# Patient Record
Sex: FEMALE | Race: WHITE | NOT HISPANIC OR LATINO | Employment: UNEMPLOYED | URBAN - METROPOLITAN AREA
[De-identification: names, ages, dates, MRNs, and addresses within clinical notes are randomized per-mention and may not be internally consistent; named-entity substitution may affect disease eponyms.]

---

## 2018-09-21 ENCOUNTER — OFFICE VISIT CONVERTED (OUTPATIENT)
Dept: FAMILY MEDICINE CLINIC | Facility: CLINIC | Age: 53
End: 2018-09-21
Attending: NURSE PRACTITIONER

## 2019-11-25 ENCOUNTER — OFFICE VISIT CONVERTED (OUTPATIENT)
Dept: FAMILY MEDICINE CLINIC | Facility: CLINIC | Age: 54
End: 2019-11-25
Attending: FAMILY MEDICINE

## 2019-11-25 ENCOUNTER — CONVERSION ENCOUNTER (OUTPATIENT)
Dept: FAMILY MEDICINE CLINIC | Facility: CLINIC | Age: 54
End: 2019-11-25

## 2019-11-25 ENCOUNTER — HOSPITAL ENCOUNTER (OUTPATIENT)
Dept: FAMILY MEDICINE CLINIC | Facility: CLINIC | Age: 54
Discharge: HOME | End: 2019-11-25
Attending: FAMILY MEDICINE

## 2019-11-25 LAB
ALBUMIN SERPL-MCNC: 4.4 G/DL (ref 3.5–5)
ALBUMIN/GLOB SERPL: 1.6 {RATIO} (ref 1.4–2.6)
ALP SERPL-CCNC: 149 U/L (ref 53–141)
ALT SERPL-CCNC: 13 U/L (ref 10–40)
ANION GAP SERPL CALC-SCNC: 21 MMOL/L (ref 8–19)
AST SERPL-CCNC: 18 U/L (ref 15–50)
BASOPHILS # BLD AUTO: 0.08 10*3/UL (ref 0–0.2)
BASOPHILS NFR BLD AUTO: 0.9 % (ref 0–3)
BILIRUB SERPL-MCNC: 0.26 MG/DL (ref 0.2–1.3)
BUN SERPL-MCNC: 10 MG/DL (ref 5–25)
BUN/CREAT SERPL: 11 {RATIO} (ref 6–20)
CALCIUM SERPL-MCNC: 9.9 MG/DL (ref 8.7–10.4)
CHLORIDE SERPL-SCNC: 101 MMOL/L (ref 99–111)
CHOLEST SERPL-MCNC: 195 MG/DL (ref 107–200)
CHOLEST/HDLC SERPL: 3.6 {RATIO} (ref 3–6)
CONV ABS IMM GRAN: 0.04 10*3/UL (ref 0–0.2)
CONV CO2: 23 MMOL/L (ref 22–32)
CONV IMMATURE GRAN: 0.5 % (ref 0–1.8)
CONV TOTAL PROTEIN: 7.2 G/DL (ref 6.3–8.2)
CREAT UR-MCNC: 0.95 MG/DL (ref 0.5–0.9)
DEPRECATED RDW RBC AUTO: 40 FL (ref 36.4–46.3)
EOSINOPHIL # BLD AUTO: 0.11 10*3/UL (ref 0–0.7)
EOSINOPHIL # BLD AUTO: 1.3 % (ref 0–7)
ERYTHROCYTE [DISTWIDTH] IN BLOOD BY AUTOMATED COUNT: 12.4 % (ref 11.7–14.4)
GFR SERPLBLD BASED ON 1.73 SQ M-ARVRAT: >60 ML/MIN/{1.73_M2}
GLOBULIN UR ELPH-MCNC: 2.8 G/DL (ref 2–3.5)
GLUCOSE SERPL-MCNC: 187 MG/DL (ref 65–99)
HCT VFR BLD AUTO: 39.5 % (ref 37–47)
HDLC SERPL-MCNC: 54 MG/DL (ref 40–60)
HGB BLD-MCNC: 13.2 G/DL (ref 12–16)
LDLC SERPL CALC-MCNC: 126 MG/DL (ref 70–100)
LYMPHOCYTES # BLD AUTO: 2.22 10*3/UL (ref 1–5)
LYMPHOCYTES NFR BLD AUTO: 25.9 % (ref 20–45)
MCH RBC QN AUTO: 29.9 PG (ref 27–31)
MCHC RBC AUTO-ENTMCNC: 33.4 G/DL (ref 33–37)
MCV RBC AUTO: 89.6 FL (ref 81–99)
MONOCYTES # BLD AUTO: 0.52 10*3/UL (ref 0.2–1.2)
MONOCYTES NFR BLD AUTO: 6.1 % (ref 3–10)
NEUTROPHILS # BLD AUTO: 5.61 10*3/UL (ref 2–8)
NEUTROPHILS NFR BLD AUTO: 65.3 % (ref 30–85)
NRBC CBCN: 0 % (ref 0–0.7)
OSMOLALITY SERPL CALC.SUM OF ELEC: 294 MOSM/KG (ref 273–304)
PLATELET # BLD AUTO: 276 10*3/UL (ref 130–400)
PMV BLD AUTO: 9.9 FL (ref 9.4–12.3)
POTASSIUM SERPL-SCNC: 4.6 MMOL/L (ref 3.5–5.3)
RBC # BLD AUTO: 4.41 10*6/UL (ref 4.2–5.4)
SODIUM SERPL-SCNC: 140 MMOL/L (ref 135–147)
T4 FREE SERPL-MCNC: 1.4 NG/DL (ref 0.9–1.8)
TRIGL SERPL-MCNC: 77 MG/DL (ref 40–150)
TSH SERPL-ACNC: 3.14 M[IU]/L (ref 0.27–4.2)
VLDLC SERPL-MCNC: 15 MG/DL (ref 5–37)
WBC # BLD AUTO: 8.58 10*3/UL (ref 4.8–10.8)

## 2019-12-16 ENCOUNTER — CONVERSION ENCOUNTER (OUTPATIENT)
Dept: FAMILY MEDICINE CLINIC | Facility: CLINIC | Age: 54
End: 2019-12-16

## 2019-12-16 ENCOUNTER — OFFICE VISIT CONVERTED (OUTPATIENT)
Dept: FAMILY MEDICINE CLINIC | Facility: CLINIC | Age: 54
End: 2019-12-16
Attending: FAMILY MEDICINE

## 2019-12-16 ENCOUNTER — HOSPITAL ENCOUNTER (OUTPATIENT)
Dept: FAMILY MEDICINE CLINIC | Facility: CLINIC | Age: 54
Discharge: HOME | End: 2019-12-16
Attending: FAMILY MEDICINE

## 2019-12-16 LAB
EST. AVERAGE GLUCOSE BLD GHB EST-MCNC: 197 MG/DL
HBA1C MFR BLD: 8.5 % (ref 3.5–5.7)

## 2019-12-18 LAB
C DIFF TOX B STL QL CT TISS CULT: NEGATIVE
CONV 027 TOXIN: NEGATIVE

## 2019-12-20 ENCOUNTER — CONVERSION ENCOUNTER (OUTPATIENT)
Dept: FAMILY MEDICINE CLINIC | Facility: CLINIC | Age: 54
End: 2019-12-20

## 2019-12-20 LAB — BACTERIA SPEC AEROBE CULT: NORMAL

## 2019-12-30 ENCOUNTER — OFFICE VISIT CONVERTED (OUTPATIENT)
Dept: FAMILY MEDICINE CLINIC | Facility: CLINIC | Age: 54
End: 2019-12-30
Attending: FAMILY MEDICINE

## 2019-12-30 ENCOUNTER — HOSPITAL ENCOUNTER (OUTPATIENT)
Dept: FAMILY MEDICINE CLINIC | Facility: CLINIC | Age: 54
Discharge: HOME | End: 2019-12-30
Attending: FAMILY MEDICINE

## 2019-12-30 ENCOUNTER — CONVERSION ENCOUNTER (OUTPATIENT)
Dept: FAMILY MEDICINE CLINIC | Facility: CLINIC | Age: 54
End: 2019-12-30

## 2020-01-02 LAB
AMPICILLIN SUSC ISLT: <=2
BACTERIA UR CULT: ABNORMAL
CIPROFLOXACIN SUSC ISLT: 1
CONV GENTAMICIN HIGH LEVEL SYNERGY: ABNORMAL
CONV STREPTOMYCIN HIGH LEVEL SYNERGY: ABNORMAL
DAPTOMYCIN SUSC ISLT: 4
DOXYCYCLINE SUSC ISLT: <=0.5
ERYTHROMYCIN SUSC ISLT: <=0.25
LEVOFLOXACIN SUSC ISLT: 2
LINEZOLID SUSC ISLT: 2
NITROFURANTOIN SUSC ISLT: <=16
TETRACYCLINE SUSC ISLT: <=1
VANCOMYCIN SUSC ISLT: 2

## 2020-01-08 ENCOUNTER — CONVERSION ENCOUNTER (OUTPATIENT)
Dept: FAMILY MEDICINE CLINIC | Facility: CLINIC | Age: 55
End: 2020-01-08

## 2020-01-14 ENCOUNTER — HOSPITAL ENCOUNTER (OUTPATIENT)
Dept: FAMILY MEDICINE CLINIC | Facility: CLINIC | Age: 55
Discharge: HOME | End: 2020-01-14
Attending: FAMILY MEDICINE

## 2020-01-14 LAB
APPEARANCE UR: CLEAR
BILIRUB UR QL: NEGATIVE
COLOR UR: YELLOW
CONV BACTERIA: NEGATIVE
CONV COLLECTION SOURCE (UA): ABNORMAL
CONV UROBILINOGEN IN URINE BY AUTOMATED TEST STRIP: 0.2 {EHRLICHU}/DL (ref 0.1–1)
GLUCOSE UR QL: NEGATIVE MG/DL
HGB UR QL STRIP: NEGATIVE
KETONES UR QL STRIP: NEGATIVE MG/DL
LEUKOCYTE ESTERASE UR QL STRIP: ABNORMAL
NITRITE UR QL STRIP: NEGATIVE
PH UR STRIP.AUTO: 5.5 [PH] (ref 5–8)
PROT UR QL: NEGATIVE MG/DL
RBC #/AREA URNS HPF: ABNORMAL /[HPF]
SP GR UR: 1.02 (ref 1–1.03)
WBC #/AREA URNS HPF: ABNORMAL /[HPF]

## 2020-01-16 LAB — BACTERIA UR CULT: NORMAL

## 2020-01-20 ENCOUNTER — OFFICE VISIT CONVERTED (OUTPATIENT)
Dept: FAMILY MEDICINE CLINIC | Facility: CLINIC | Age: 55
End: 2020-01-20
Attending: FAMILY MEDICINE

## 2020-01-20 ENCOUNTER — HOSPITAL ENCOUNTER (OUTPATIENT)
Dept: FAMILY MEDICINE CLINIC | Facility: CLINIC | Age: 55
Discharge: HOME | End: 2020-01-20
Attending: FAMILY MEDICINE

## 2020-01-20 ENCOUNTER — CONVERSION ENCOUNTER (OUTPATIENT)
Dept: FAMILY MEDICINE CLINIC | Facility: CLINIC | Age: 55
End: 2020-01-20

## 2020-01-28 ENCOUNTER — OFFICE VISIT CONVERTED (OUTPATIENT)
Dept: FAMILY MEDICINE CLINIC | Facility: CLINIC | Age: 55
End: 2020-01-28
Attending: NURSE PRACTITIONER

## 2020-02-03 ENCOUNTER — OFFICE VISIT CONVERTED (OUTPATIENT)
Dept: FAMILY MEDICINE CLINIC | Facility: CLINIC | Age: 55
End: 2020-02-03
Attending: FAMILY MEDICINE

## 2020-02-20 ENCOUNTER — OFFICE VISIT CONVERTED (OUTPATIENT)
Dept: FAMILY MEDICINE CLINIC | Facility: CLINIC | Age: 55
End: 2020-02-20
Attending: FAMILY MEDICINE

## 2020-04-13 ENCOUNTER — OFFICE VISIT CONVERTED (OUTPATIENT)
Dept: OTHER | Facility: HOSPITAL | Age: 55
End: 2020-04-13
Attending: PHYSICIAN ASSISTANT

## 2020-04-13 ENCOUNTER — HOSPITAL ENCOUNTER (OUTPATIENT)
Dept: OTHER | Facility: HOSPITAL | Age: 55
Discharge: HOME | End: 2020-04-13
Attending: PHYSICIAN ASSISTANT

## 2020-04-15 LAB — SARS-COV-2 RNA SPEC QL NAA+PROBE: NOT DETECTED

## 2020-04-17 ENCOUNTER — OFFICE VISIT CONVERTED (OUTPATIENT)
Dept: FAMILY MEDICINE CLINIC | Facility: CLINIC | Age: 55
End: 2020-04-17
Attending: FAMILY MEDICINE

## 2020-04-17 ENCOUNTER — HOSPITAL ENCOUNTER (OUTPATIENT)
Dept: FAMILY MEDICINE CLINIC | Facility: CLINIC | Age: 55
Discharge: HOME | End: 2020-04-17
Attending: FAMILY MEDICINE

## 2020-04-17 LAB
ALBUMIN SERPL-MCNC: 4.5 G/DL (ref 3.5–5)
ALBUMIN/GLOB SERPL: 1.6 {RATIO} (ref 1.4–2.6)
ALP SERPL-CCNC: 129 U/L (ref 53–141)
ALT SERPL-CCNC: 15 U/L (ref 10–40)
ANION GAP SERPL CALC-SCNC: 20 MMOL/L (ref 8–19)
AST SERPL-CCNC: 14 U/L (ref 15–50)
BASOPHILS # BLD AUTO: 0.06 10*3/UL (ref 0–0.2)
BASOPHILS NFR BLD AUTO: 0.8 % (ref 0–3)
BILIRUB SERPL-MCNC: 0.32 MG/DL (ref 0.2–1.3)
BUN SERPL-MCNC: 14 MG/DL (ref 5–25)
BUN/CREAT SERPL: 15 {RATIO} (ref 6–20)
CALCIUM SERPL-MCNC: 10 MG/DL (ref 8.7–10.4)
CHLORIDE SERPL-SCNC: 103 MMOL/L (ref 99–111)
CONV ABS IMM GRAN: 0.01 10*3/UL (ref 0–0.2)
CONV CO2: 23 MMOL/L (ref 22–32)
CONV IMMATURE GRAN: 0.1 % (ref 0–1.8)
CONV TOTAL PROTEIN: 7.4 G/DL (ref 6.3–8.2)
CREAT UR-MCNC: 0.93 MG/DL (ref 0.5–0.9)
DEPRECATED RDW RBC AUTO: 41.4 FL (ref 36.4–46.3)
EOSINOPHIL # BLD AUTO: 0.07 10*3/UL (ref 0–0.7)
EOSINOPHIL # BLD AUTO: 0.9 % (ref 0–7)
ERYTHROCYTE [DISTWIDTH] IN BLOOD BY AUTOMATED COUNT: 12.6 % (ref 11.7–14.4)
GFR SERPLBLD BASED ON 1.73 SQ M-ARVRAT: >60 ML/MIN/{1.73_M2}
GLOBULIN UR ELPH-MCNC: 2.9 G/DL (ref 2–3.5)
GLUCOSE SERPL-MCNC: 150 MG/DL (ref 65–99)
HCT VFR BLD AUTO: 41.3 % (ref 37–47)
HGB BLD-MCNC: 13.5 G/DL (ref 12–16)
LYMPHOCYTES # BLD AUTO: 1.86 10*3/UL (ref 1–5)
LYMPHOCYTES NFR BLD AUTO: 24.2 % (ref 20–45)
MCH RBC QN AUTO: 29.5 PG (ref 27–31)
MCHC RBC AUTO-ENTMCNC: 32.7 G/DL (ref 33–37)
MCV RBC AUTO: 90.4 FL (ref 81–99)
MONOCYTES # BLD AUTO: 0.46 10*3/UL (ref 0.2–1.2)
MONOCYTES NFR BLD AUTO: 6 % (ref 3–10)
NEUTROPHILS # BLD AUTO: 5.23 10*3/UL (ref 2–8)
NEUTROPHILS NFR BLD AUTO: 68 % (ref 30–85)
NRBC CBCN: 0 % (ref 0–0.7)
OSMOLALITY SERPL CALC.SUM OF ELEC: 297 MOSM/KG (ref 273–304)
PLATELET # BLD AUTO: 265 10*3/UL (ref 130–400)
PMV BLD AUTO: 10 FL (ref 9.4–12.3)
POTASSIUM SERPL-SCNC: 4.1 MMOL/L (ref 3.5–5.3)
RBC # BLD AUTO: 4.57 10*6/UL (ref 4.2–5.4)
SODIUM SERPL-SCNC: 142 MMOL/L (ref 135–147)
TROPONIN T SERPL-MCNC: <0.01 NG/ML (ref 0–0.1)
WBC # BLD AUTO: 7.69 10*3/UL (ref 4.8–10.8)

## 2020-04-19 LAB — BACTERIA SPEC AEROBE CULT: NORMAL

## 2020-04-29 ENCOUNTER — OFFICE VISIT CONVERTED (OUTPATIENT)
Dept: CARDIOLOGY | Facility: CLINIC | Age: 55
End: 2020-04-29
Attending: INTERNAL MEDICINE

## 2020-05-12 ENCOUNTER — HOSPITAL ENCOUNTER (OUTPATIENT)
Dept: NUCLEAR MEDICINE | Facility: HOSPITAL | Age: 55
Discharge: HOME | End: 2020-05-12
Attending: INTERNAL MEDICINE

## 2020-05-18 ENCOUNTER — OFFICE VISIT CONVERTED (OUTPATIENT)
Dept: FAMILY MEDICINE CLINIC | Facility: CLINIC | Age: 55
End: 2020-05-18
Attending: FAMILY MEDICINE

## 2020-07-02 ENCOUNTER — OFFICE VISIT CONVERTED (OUTPATIENT)
Dept: FAMILY MEDICINE CLINIC | Facility: CLINIC | Age: 55
End: 2020-07-02
Attending: FAMILY MEDICINE

## 2020-10-29 ENCOUNTER — HOSPITAL ENCOUNTER (OUTPATIENT)
Dept: FAMILY MEDICINE CLINIC | Facility: CLINIC | Age: 55
Discharge: HOME | End: 2020-10-29
Attending: FAMILY MEDICINE

## 2020-10-29 ENCOUNTER — CONVERSION ENCOUNTER (OUTPATIENT)
Dept: FAMILY MEDICINE CLINIC | Facility: CLINIC | Age: 55
End: 2020-10-29

## 2020-10-31 LAB — BACTERIA SPEC AEROBE CULT: NORMAL

## 2020-11-01 LAB — SARS-COV-2 RNA SPEC QL NAA+PROBE: NOT DETECTED

## 2020-11-30 ENCOUNTER — OFFICE VISIT CONVERTED (OUTPATIENT)
Dept: FAMILY MEDICINE CLINIC | Facility: CLINIC | Age: 55
End: 2020-11-30
Attending: FAMILY MEDICINE

## 2020-11-30 ENCOUNTER — HOSPITAL ENCOUNTER (OUTPATIENT)
Dept: FAMILY MEDICINE CLINIC | Facility: CLINIC | Age: 55
Discharge: HOME | End: 2020-11-30
Attending: FAMILY MEDICINE

## 2020-12-02 LAB — SARS-COV-2 RNA SPEC QL NAA+PROBE: NOT DETECTED
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
## 2021-05-07 NOTE — PROGRESS NOTES
Progress Note      Patient Name: Yamilex David   Patient ID: [id number]   Sex: Female   YOB: 1965        Visit Date: November 25, 2019    Provider: Emory Mendez MD   Location: [facility]   Location Address: [address] [place] [address] [place]  [address]   Location Phone: [phone]          Chief Complaint     asking for refills on her buspirone and her lithium due to she can not afford to keep going to the Phys office and do all their classes to keep on the medicines.   she is also asking for her Pantoprazole filled.       History Of Present Illness  Yamilex Dvaid is a 54 year old /White female who presents for evaluation and treatment of:      pt was on lithium for depression- pt told to see psyche if she needs to continue lithium due to side effect profile- pt not on same meds so is willing to try Prozac in place of lithium- pt no longer on levodopa- pt has been off all her meds including lithium and buspar for 1 yr    GERD- uncontrolled- need to restart protonix       Past Medical History  Disease Name Date Onset Notes   Anxiety --  --    Anxiety and depression 11/25/2019 --    Depression --  --    Essential hypertension 11/25/2019 --    GERD (gastroesophageal reflux disease) --  --    Hypertension, Benign Essential --  --    Seizures --  --          Past Surgical History  Procedure Name Date Notes   Hysterectomy --  --    Hysterectomy (due to cancer) --  --    Kidney removal --  --    Thyroid Removal --  --    Tonsillectomy --  --          Medication List  Name Date Started Instructions   Benadryl 25 mg oral capsule  --    Parlodel 2.5 mg oral tablet  take 1 tablet (2.5 mg) by oral route 3 times per day with food         Allergy List  Allergen Name Date Reaction Notes   NO KNOWN DRUG ALLERGIES --  --  --        Allergies Reconciled  Family Medical History  Disease Name Relative/Age Notes   DM Type I Father/  Mother/   Father; Mother   Hypertension Mother/    "Mother   Arthrtis Mother/   Mother   Mother, Grandmother, or Sister developed Heart Disease before the age of 65  --          Social History  Finding Status Start/Stop Quantity Notes   Recreational Drug Use Never --/-- --  never used   Second hand smoke exposure Unknown --/-- --  no   Tobacco Never --/-- --  never smoker   Uses seatbelts --  --/-- --  yes         Review of Systems  · Constitutional  o Denies  o : fatigue, fever  · Cardiovascular  o Denies  o : chest pain, palpitations  · Respiratory  o Denies  o : shortness of breath, cough  · Gastrointestinal  o Admits  o : heartburn  o Denies  o : nausea, vomiting, diarrhea, abdominal pain  · Psychiatric  o Admits  o : anxiety, depression, difficulty sleeping  o Denies  o : suicidal ideation, homicidal ideation      Vitals  Date Time BP Position Site L\R Cuff Size HR RR TEMP (F) WT  HT  BMI kg/m2 BSA m2 O2 Sat HC       11/25/2019 09:38 /104 Sitting    98 - R  96.8 179lbs 2oz 5'  3\" 31.73 1.9 97 %    11/25/2019 10:13 /102 Sitting                     Physical Examination  · Constitutional  o Appearance  o : well developed, well-nourished, in no acute distress  · Head and Face  o HEENT  o : Unremarkable  · Respiratory  o Respiratory Effort  o : breathing unlabored  o Auscultation of Lungs  o : clear to ascultation  · Cardiovascular  o Heart  o :   § Auscultation of Heart  § : regular rate and rhythm  o Peripheral Vascular System  o :   § Extremities  § : no edema  · Gastrointestinal  o Abdomen  o : soft, non-tender, non-distended, + bowel sounds, no hepatosplenomegaly, no masses palpated  · Musculoskeletal  o General  o :   § General Musculoskeletal  § : No joint swelling or deformity., Muscle tone, strength, and development grossly normal.  · Neurologic  o Gait and Station  o :   § Gait Screening  § : normal gait  · Psychiatric  o Mood and Affect  o : mood normal, affect appropriate          Assessment  · Essential hypertension     401.9/I10  · GERD " (gastroesophageal reflux disease)     530.81/K21.9  · Anxiety and depression       Anxiety disorder, unspecified     300.00/F41.9  Major depressive disorder, single episode, unspecified     300.00/F32.9  · Screening cholesterol level     V77.91/Z13.220      Plan  · Orders  o CBC with Auto Diff [facility] ([id number]) - 401.9/I10, 300.00/F41.9, 300.00/F32.9, 530.81/K21.9 - 11/25/2019  o CMP [facility] ([id number]) - 401.9/I10 - 11/25/2019  o Lipid Panel [facility] ([id number]) - 401.9/I10 - 11/25/2019  o Thyroid Profile (THYII) - 401.9/I10, 300.00/F41.9, 300.00/F32.9 - 11/25/2019  o ACO-39: Current medications updated and reviewed () - - 11/25/2019  · Medications  o Prozac 20 mg oral capsule   SIG: take 1 capsule (20 mg) by oral route once daily in the evening for 30 days   DISP: (30) capsules with 1 refills  Prescribed on 11/25/2019     o buspirone 7.5 mg oral tablet   SIG: take 1 tablet (7.5 mg) by oral route 2 times per day for 30 days   DISP: (60) tablets with 1 refills  Prescribed on 11/25/2019     o Protonix 40 mg oral tablet,delayed release (DR/EC)   SIG: take 1 tablet (40 mg) by oral route once daily for 30 days   DISP: (30) tablets with 1 refills  Prescribed on 11/25/2019     o lisinopril 10 mg oral tablet   SIG: take 1 tablet (10 mg) by oral route once daily for 30 days   DISP: (30) tablets with 1 refills  Prescribed on 11/25/2019     o Tegretol oral   SIG: take 1 by oral route 3 times a day   DISP: # 0 with 0 refills  Discontinued on 11/25/2019     o Medications have been Reconciled  o Transition of Care or Provider Policy  · Instructions  o Patient was educated/instructed on their diagnosis, treatment and medications prior to discharge from the clinic today.  · Disposition  o Return Visit Request in/on 1 week +/- 2 days ([id number]).            Electronically Signed by: Emory Mendez MD -Author on November 25, 2019 10:15:10 AM
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
## 2021-05-07 NOTE — PROGRESS NOTES
Progress Note      Patient Name: Yamilex David   Patient ID: [id number]   Sex: Female   YOB: 1965    Primary Care Provider: Emory Mendez MD   Referring Provider: Emory Mendez MD    Visit Date: December 30, 2019    Provider: Emory Mendez MD   Location: [facility]   Location Address: [address] [place] [address]   [place]  [address]   Location Phone: [phone]          Chief Complaint     Follow up on blood pressure. Dysuria x one week. Check urine       History Of Present Illness  Yamilex David is a 54 year old /White female who presents for evaluation and treatment of:      urinary symptoms x 1-1.5 week- sudden onset- worsening symptoms  HTN- uncontrolled but getting better- pt on lisinopril 10mg BID  Carafate is helping stomach a lot  discussed labs  newly diagnosed diabetes       Past Medical History  Disease Name Date Onset Notes   Anxiety --  --    Anxiety and depression 11/25/2019 --    Depression --  --    Essential hypertension 11/25/2019 --    GERD (gastroesophageal reflux disease) --  --    Hypertension, Benign Essential --  --    Seizures --  --          Past Surgical History  Procedure Name Date Notes   Hysterectomy --  --    Hysterectomy (due to cancer) --  --    Kidney removal --  --    Thyroid Removal --  --    Tonsillectomy --  --          Medication List  Name Date Started Instructions   Benadryl 25 mg oral capsule  --    buspirone 7.5 mg oral tablet 11/25/2019 take 1 tablet (7.5 mg) by oral route 2 times per day for 30 days   Carafate 1 gram oral tablet 12/16/2019 take 1 tablet (1 gram) by oral route 4 times per day on an empty stomach 1 hour before meals and at bedtime for 30 days   lisinopril 30 mg oral tablet 12/30/2019 take 1 tablet (30 mg) by oral route once daily for 30 days   Parlodel 2.5 mg oral tablet  take 1 tablet (2.5 mg) by oral route 3 times per day with food   Protonix 40 mg oral tablet,delayed release (/EC) 11/25/2019 take 1  tablet (40 mg) by oral route once daily for 30 days   Prozac 20 mg oral capsule 11/25/2019 take 1 capsule (20 mg) by oral route once daily in the evening for 30 days   Tegretol 100 mg/5 mL oral suspension  2 PILLS TID         Allergy List  Allergen Name Date Reaction Notes   NO KNOWN DRUG ALLERGIES --  --  --          Family Medical History  Disease Name Relative/Age Notes   DM Type I Father/  Mother/   Father; Mother   Hypertension Mother/   Mother   Arthrtis Mother/   Mother   Mother, Grandmother, or Sister developed Heart Disease before the age of 65  --          Social History  Finding Status Start/Stop Quantity Notes   Recreational Drug Use Never --/-- --  never used   Second hand smoke exposure Unknown --/-- --  no   Tobacco Never --/-- --  never smoker   Uses seatbelts --  --/-- --  yes         Review of Systems  · Constitutional  o Denies  o : fatigue, fever  · Cardiovascular  o Denies  o : chest pain, palpitations  · Respiratory  o Denies  o : shortness of breath, cough  · Gastrointestinal  o Denies  o : nausea, vomiting, diarrhea  · Genitourinary  o Admits  o : dysuria  o Denies  o : urinary retention      Vitals  Date Time BP Position Site L\R Cuff Size HR RR TEMP (F) WT  HT  BMI kg/m2 BSA m2 O2 Sat        12/30/2019 05:02 /84 Sitting    94 - R  97.7 174lbs 16oz    96 %          Physical Examination  · Constitutional  o Appearance  o : well developed, well-nourished, in no acute distress  · Respiratory  o Respiratory Effort  o : breathing unlabored  o Auscultation of Lungs  o : clear to ascultation  · Cardiovascular  o Heart  o :   § Auscultation of Heart  § : regular rate and rhythm  o Peripheral Vascular System  o :   § Extremities  § : no edema  · Gastrointestinal  o Abdomen  o : soft, non-tender, non-distended, + bowel sounds, no hepatosplenomegaly, no masses palpated, no CVA tenderness  · Musculoskeletal  o General  o :   § General Musculoskeletal  § : No joint swelling or deformity., Muscle  tone, strength, and development grossly normal.  · Neurologic  o Gait and Station  o :   § Gait Screening  § : normal gait  · Psychiatric  o Mood and Affect  o : mood normal, affect appropriate  · Left DM Foot Exam  o Sensation  o : normal sensory exam perceptible to 10-gram nylon monofilament exam (5/5), vibration and light touch.  o Visual Inspection  o : visual inspection is normal with no signs of breakdown, ulcerations or deformities unless otherwise noted.   o Vascular  o : palpable dorsalis pedis and posterir tibialis pulses present, normal capillary refill  · Right DM Foot Exam  o Sensation  o : normal sensory exam perceptible to 10-gram nylon monofilament exam (5/5), vibration and light touch.  o Visual Inspection  o : visual inspection is normal with no signs of breakdown, ulcerations or deformities unless otherwise noted.   o Vascular  o : palpable dorsalis pedis and posterir tibialis pulses present, normal capillary refill          Assessment  · Diabetes mellitus, type 2     250.00/E11.9  · Essential hypertension     401.9/I10  · Dysuria     788.1/R30.0  · UTI (urinary tract infection)     599.0/N39.0  · Newly diagnosed diabetes     250.00/E11.9      Plan  · Orders  o OPHTHALMOLOGY CONSULTATION (OPHTH) - 250.00/E11.9 - 12/30/2019  o Diabetic Foot (Motor and Sensory) Exam Completed [facility] (, , 2028F) - 250.00/E11.9 - 12/30/2019  o IOP - Urinalysis without Microscopy (Clinitek) [facility] ([id number]) - - 12/30/2019   GLU NEG CATERINA NEG KET TRACE SG 1.025 BLO TRACE PH 5.0 PRO NEG URO 0.2 NIT NEG MICHEL MOD  o Urine culture (23512, 78509) - 788.1/R30.0, 401.9/I10, 599.0/N39.0 - 12/30/2019  o ACO-39: Current medications updated and reviewed () - - 12/30/2019  · Medications  o Macrobid 100 mg oral capsule   SIG: take 1 capsule (100 mg) by oral route every 12 hours with food for 7 days   DISP: (14) capsules with 0 refills  Prescribed on 12/30/2019     o Pyridium 100 mg oral tablet   SIG: take 1 tablet (100 mg) by  oral route 3 times per day after meals for 4 days   DISP: (12) tablets with 0 refills  Prescribed on 12/30/2019     o metformin 500 mg oral tablet extended release 24 hr   SIG: take 2 tablets (1,000 mg) by oral route 2 times per day for 30 days   DISP: (120) tablets with 5 refills  Prescribed on 12/30/2019     o lisinopril 30 mg oral tablet   SIG: take 1 tablet (30 mg) by oral route once daily for 30 days   DISP: (30) tablets with 1 refills  Adjusted on 12/30/2019     · Instructions  o Handouts were given to patient: diet.  o Patient instructed/educated on their diet and exercise program.  o Patient was educated/instructed on their diagnosis, treatment and medications prior to discharge from the clinic today.  o Electronically Identified Patient Education Materials Provided Electronically            Electronically Signed by: Emory Mendez MD -Author on December 30, 2019 05:34:28 PM

## 2021-05-07 NOTE — PROGRESS NOTES
Progress Note      Patient Name: Yamilex David   Patient ID: [id number]   Sex: Female   YOB: 1965        Visit Date: December 16, 2019    Provider: Emory Mendez MD   Location: [facility]   Location Address: [address] [place] [address] [place]  [address]   Location Phone: [phone]          Chief Complaint     follow up on new medicine. biggest concern is diarrhea for 1.5 weeks and 3 days of blood in stool. calmer and more stable.   Lisinopril and buspar started the monday after thanksgiving. Started Prozac 2 weeks ago.  still has a lot of stress on her. he father past since being here.       History Of Present Illness  Yamilex David is a 54 year old /White female who presents for evaluation and treatment of:      pt says new meds helping  HTN- controlled on meds  anxiety/depression- meds helping  pt having watery brown diarrhea, some bloody diarrhea  discussed labs  elevated blood glucose       Past Medical History  Disease Name Date Onset Notes   Anxiety --  --    Anxiety and depression 11/25/2019 --    Depression --  --    Essential hypertension 11/25/2019 --    GERD (gastroesophageal reflux disease) --  --    Hypertension, Benign Essential --  --    Seizures --  --          Past Surgical History  Procedure Name Date Notes   Hysterectomy --  --    Hysterectomy (due to cancer) --  --    Kidney removal --  --    Thyroid Removal --  --    Tonsillectomy --  --          Medication List  Name Date Started Instructions   Benadryl 25 mg oral capsule  --    buspirone 7.5 mg oral tablet 11/25/2019 take 1 tablet (7.5 mg) by oral route 2 times per day for 30 days   lisinopril 10 mg oral tablet 11/25/2019 take 1 tablet (10 mg) by oral route once daily for 30 days   Parlodel 2.5 mg oral tablet  take 1 tablet (2.5 mg) by oral route 3 times per day with food   Protonix 40 mg oral tablet,delayed release (DR/EC) 11/25/2019 take 1 tablet (40 mg) by oral route once daily for 30 days  "  Prozac 20 mg oral capsule 11/25/2019 take 1 capsule (20 mg) by oral route once daily in the evening for 30 days         Allergy List  Allergen Name Date Reaction Notes   NO KNOWN DRUG ALLERGIES --  --  --          Family Medical History  Disease Name Relative/Age Notes   DM Type I Father/  Mother/   Father; Mother   Hypertension Mother/   Mother   Arthrtis Mother/   Mother   Mother, Grandmother, or Sister developed Heart Disease before the age of 65  --          Social History  Finding Status Start/Stop Quantity Notes   Recreational Drug Use Never --/-- --  never used   Second hand smoke exposure Unknown --/-- --  no   Tobacco Never --/-- --  never smoker   Uses seatbelts --  --/-- --  yes         Review of Systems  · Constitutional  o Denies  o : fatigue, fever  · Cardiovascular  o Denies  o : chest pain, palpitations  · Respiratory  o Denies  o : shortness of breath, cough  · Gastrointestinal  o Admits  o : diarrhea, blood in stools  o Denies  o : nausea, vomiting, abdominal pain      Vitals  Date Time BP Position Site L\R Cuff Size HR RR TEMP (F) WT  HT  BMI kg/m2 BSA m2 O2 Sat        12/16/2019 04:04 /76 Sitting    102 - R  97.6 179lbs 8oz 5'  3\" 31.8 1.9 99 %          Physical Examination  · Constitutional  o Appearance  o : well developed, well-nourished, in no acute distress  · Respiratory  o Respiratory Effort  o : breathing unlabored  o Auscultation of Lungs  o : clear to ascultation  · Cardiovascular  o Heart  o :   § Auscultation of Heart  § : regular rate and rhythm  o Peripheral Vascular System  o :   § Extremities  § : no edema  · Gastrointestinal  o Abdomen  o : soft, non-tender, non-distended, + bowel sounds, no hepatosplenomegaly, no masses palpated  · Musculoskeletal  o General  o :   § General Musculoskeletal  § : No joint swelling or deformity., Muscle tone, strength, and development grossly normal.  · Neurologic  o Gait and Station  o :   § Gait Screening  § : normal " gait  · Psychiatric  o Mood and Affect  o : mood normal, affect appropriate          Assessment  · Diarrhea     787.91/R19.7  · Blood in stool     578.1/K92.1  · Elevated random blood glucose level     790.29/R73.09      Plan  · Orders  o Hgb A1c [facility] (53845) - 790.29/R73.09 - 12/16/2019  o ACO-39: Current medications updated and reviewed () - - 12/16/2019  o Stool culture (30068, 24341) - 787.91/R19.7, 578.1/K92.1 - 12/16/2019  o Stool occult blood screen by immunoassay (14156) - 787.91/R19.7, 578.1/K92.1 - 12/16/2019  o Clostridium difficile toxins A and B panel by enzyme immunoassay (EIA) (35664) - 787.91/R19.7, 578.1/K92.1 - 12/16/2019  o H pylori ag stool (73951) - 787.91/R19.7, 578.1/K92.1 - 12/16/2019  o Gastroenterology Consultation (GASTR) - 787.91/R19.7, 578.1/K92.1 - 12/16/2019  · Medications  o Carafate 1 gram oral tablet   SIG: take 1 tablet (1 gram) by oral route 4 times per day on an empty stomach 1 hour before meals and at bedtime for 30 days   DISP: (120) tablets with 1 refills  Prescribed on 12/16/2019     o Medications have been Reconciled  o Transition of Care or Provider Policy  · Instructions  o Patient was educated/instructed on their diagnosis, treatment and medications prior to discharge from the clinic today.            Electronically Signed by: Emory Mendez MD -Author on December 16, 2019 04:45:53 PM

## 2021-05-07 NOTE — PROGRESS NOTES
Progress Note      Patient Name: Yamilex David   Patient ID: [id number]   Sex: Female   YOB: 1965    Primary Care Provider: Emory Mendez MD   Referring Provider: Emory Mendez MD    Visit Date: November 30, 2020    Provider: Nasima Kahn DO   Location: [facility]   Location Address: [address] [place] [address]   [place]  [address]   Location Phone: [phone]          Chief Complaint     SOA, loss of taste and smell, started about 10 days ago.       History Of Present Illness  Yamilex David is a 55 year old /White female who presents for evaluation and treatment of:      1.) SOA : The patient presents with complaints of intermittent shortness of breath. She notes respiratory sxs x 3 weeks. Recent potential exposure to COVID via [employer] service and other social events - she also admits to loss of taste and smell - onset of those sxs was 10 days go. She reports a dry cough. She denies fevers or chills at home. No history of COPD       Past Medical History  Disease Name Date Onset Notes   Anxiety --  --    Anxiety and depression 11/25/2019 --    Depression --  --    Essential hypertension 11/25/2019 --    GERD (gastroesophageal reflux disease) --  --    Hypertension, Benign Essential --  --    Seizures --  --          Past Surgical History  Procedure Name Date Notes   Hysterectomy --  --    Hysterectomy (due to cancer) --  --    Kidney removal --  --    Thyroid Removal --  --    Tonsillectomy --  --          Medication List  Name Date Started Instructions   albuterol sulfate 90 mcg/actuation inhalation HFA aerosol inhaler 10/29/2020 INHALE 1 TO 2 PUFFS BY MOUTH EVERY 6 HOURS AS NEEDED   buspirone 7.5 mg oral tablet 10/29/2020 take 1 tablet by mouth twice a day   Carafate 1 gram oral tablet 10/29/2020 take 1 tablet (1 gram) by oral route 4 times per day on an empty stomach 1 hour before meals and at bedtime for 30 days   Farxiga 10 mg oral tablet 10/29/2020 take 1 tablet (10  mg) by oral route once daily in the morning for 30 days   hydrochlorothiazide 12.5 mg oral tablet 10/29/2020 TAKE 1 TABLET BY MOUTH ONCE DAILY   irbesartan 75 mg oral tablet  take 1 tablet (75 mg) by oral route once daily   lisinopril 30 mg oral tablet 10/29/2020 TAKE 1 TABLET BY MOUTH ONCE DAILY   metoprolol succinate 25 mg oral tablet extended release 24 hr  take 1 tablet (25 mg) by oral route once daily   pantoprazole 40 mg oral tablet,delayed release (DR/EC) 10/29/2020 TAKE 1 TABLET BY MOUTH ONCE DAILY   Parlodel 2.5 mg oral tablet  take 1 tablet (2.5 mg) by oral route 3 times per day with food   Tegretol 100 mg/5 mL oral suspension  2 PILLS TID   Ventolin HFA 90 mcg/actuation inhalation HFA aerosol inhaler 10/29/2020 inhale 1 - 2 puffs (90 - 180 mcg) by inhalation route every 6 hours as needed for 30 days         Allergy List  Allergen Name Date Reaction Notes   NO KNOWN DRUG ALLERGIES --  --  --        Allergies Reconciled  Family Medical History  Disease Name Relative/Age Notes   DM Type I Father/  Mother/   Father; Mother   Hypertension Mother/   Mother   Arthrtis Mother/   Mother   Mother, Grandmother, or Sister developed Heart Disease before the age of 65  --          Social History  Finding Status Start/Stop Quantity Notes   Recreational Drug Use Never --/-- --  never used   Second hand smoke exposure Unknown --/-- --  no   Tobacco Never --/-- --  never smoker   Uses seatbelts --  --/-- --  yes         Review of Systems  · Constitutional  o Denies  o : fever, chills  · Eyes  o Denies  o : discharge from eye, eye discomfort  · HENT  o Admits  o : loss of taste, loss of smell  o Denies  o : nasal congestion, nasal discharge  · Cardiovascular  o Denies  o : chest pain, syncope  · Respiratory  o Admits  o : shortness of breath, cough  · Gastrointestinal  o Denies  o : nausea, vomiting  · Integument  o Denies  o : rash, itching  · Neurologic  o Denies  o : altered mental status, tingling or  numbness  · Musculoskeletal  o Denies  o : muscle pain, muscle cramps  · Psychiatric  o Denies  o : delusions, feeling confused      Vitals  Date Time BP Position Site L\R Cuff Size HR RR TEMP (F) WT  HT  BMI kg/m2 BSA m2 O2 Sat FR L/min FiO2 HC       11/30/2020 12:51 PM      74 - R  97.4     99 %  21%          Physical Examination  · Constitutional  o Appearance  o : alert, in no acute distress  · Eyes  o Conjunctivae  o : conjunctivae normal  · Neck  o Inspection/Palpation  o : supple  · Respiratory  o Respiratory Effort  o : breathing unlabored  o Auscultation of Lungs  o : clear to ascultation  · Cardiovascular  o Peripheral Vascular System  o :   § Extremities  § : no cyanosis  · Skin and Subcutaneous Tissue  o General Inspection  o : no rash appreciated   · Psychiatric  o Mood and Affect  o : mood normal, affect appropriate          Assessment  · Dyspnea     786.09/R06.00    A.) COVID testing as noted with precautions. Antitussives as noted. Adequate fluids. Tylenol PRN fever. Follow up PRN.        Plan  · Orders  o ACO-39: Current medications updated and reviewed (1159F, ) - - 11/30/2020  o [facility] NCOV2 (send-out) (68723) - 786.09/R06.00 - 11/30/2020  · Medications  o Tessalon Perles 100 mg oral capsule   SIG: take 1 capsule (100 mg) by oral route 3 times per day as needed for cough for 7 days   DISP: (21) Capsule with 0 refills  Prescribed on 11/30/2020     o promethazine 6.25 mg/5 mL oral syrup   SIG: take 10 milliliters by oral route daily Take at bedtime   DISP: (120) Milliliter with 0 refills  Prescribed on 11/30/2020     o Medications have been Reconciled  o Transition of Care or Provider Policy  · Instructions  o Take all medications as prescribed/directed.  o Rest. Increase Fluids.  o Patient was educated/instructed on their diagnosis, treatment and medications prior to discharge from the clinic today.  · Disposition  o Call or Return if symptoms worsen or  persist.            Electronically Signed by: Nasima Kahn DO -Author on November 30, 2020 01:21:21 PM

## 2021-05-07 NOTE — PROGRESS NOTES
Progress Note      Patient Name: Yamilex David   Patient ID: [id number]   Sex: Female   YOB: 1965    Primary Care Provider: Emory Mendez MD   Referring Provider: Christine MCGUIRE    Visit Date: January 28, 2020    Provider: MAYNOR Quintero   Location: [facility]   Location Address: [address] [place] [address] [place]  [address]   Location Phone: [phone]          Chief Complaint     PATIENT IS HERE WITH SORE THROAT, TIRED, BREATHE IS NOT SMELLING WELL, WHITE SPOT ON THE BACK OF HER THROAT AND SHE WANTS TO GET TESTED FOR STREP.       History Of Present Illness  Yamilex David is a 54 year old /White female who presents for evaluation and treatment of:      pt here for the strep s/s and sore throat and the pt denies all aches and fevers--no cough--denies all flu-like s/s --pt reports the sore throat came after the antibiotic use-pt did report like a white coating on the tongue-brushes tongue and teeth comes off     then also pt reports was rechecked on her urine test for the prior UTI--and pt never heard back regarding this  and pt reports helped quit a bit and still a little burning-but pt admits not been drinking enough-- no DC and no itching            Past Medical History  Disease Name Date Onset Notes   Anxiety --  --    Anxiety and depression 11/25/2019 --    Depression --  --    Essential hypertension 11/25/2019 --    GERD (gastroesophageal reflux disease) --  --    Hypertension, Benign Essential --  --    Seizures --  --          Past Surgical History  Procedure Name Date Notes   Hysterectomy --  --    Hysterectomy (due to cancer) --  --    Kidney removal --  --    Thyroid Removal --  --    Tonsillectomy --  --          Medication List  Name Date Started Instructions   Benadryl 25 mg oral capsule  --    buspirone 7.5 mg oral tablet 01/22/2020 take 1 tablet by mouth twice a day   Carafate 1 gram oral tablet 12/16/2019 take 1 tablet (1 gram) by  oral route 4 times per day on an empty stomach 1 hour before meals and at bedtime for 30 days   fluoxetine 20 mg oral capsule 01/22/2020 take 1 capsule by mouth every evening   lisinopril 30 mg oral tablet 01/30/2020 take 1 tablet (30 mg) by oral route once daily for 90 days   metformin 500 mg oral tablet extended release 24 hr 12/30/2019 take 2 tablets (1,000 mg) by oral route 2 times per day for 30 days   pantoprazole 40 mg oral tablet,delayed release (DR/EC) 01/22/2020 take 1 tablet by mouth once daily   Parlodel 2.5 mg oral tablet  take 1 tablet (2.5 mg) by oral route 3 times per day with food   Protonix 40 mg oral tablet,delayed release (DR/EC) 11/25/2019 take 1 tablet (40 mg) by oral route once daily for 30 days   Prozac 20 mg oral capsule 11/25/2019 take 1 capsule (20 mg) by oral route once daily in the evening for 30 days   Pyridium 100 mg oral tablet 12/30/2019 take 1 tablet (100 mg) by oral route 3 times per day after meals for 4 days   Tegretol 100 mg/5 mL oral suspension  2 PILLS TID         Allergy List  Allergen Name Date Reaction Notes   NO KNOWN DRUG ALLERGIES --  --  --          Family Medical History  Disease Name Relative/Age Notes   DM Type I Father/  Mother/   Father; Mother   Hypertension Mother/   Mother   Arthrtis Mother/   Mother   Mother, Grandmother, or Sister developed Heart Disease before the age of 65  --          Social History  Finding Status Start/Stop Quantity Notes   Recreational Drug Use Never --/-- --  never used   Second hand smoke exposure Unknown --/-- --  no   Tobacco Never --/-- --  never smoker   Uses seatbelts --  --/-- --  yes         Review of Systems  · Constitutional  o Admits  o : fatigue  o Denies  o : fever  · HENT  o Admits  o : sore throat  o Denies  o : sinus pain, nasal congestion, nasal discharge, postnasal drip  · Cardiovascular  o Denies  o : chest pain, irregular heart beats  · Respiratory  o Denies  o : shortness of breath, productive  "cough  · Gastrointestinal  o Denies  o : nausea, vomiting  · Genitourinary  o Admits  o : dysuria  · Integument  o Denies  o : hair growth change, new skin lesions  · Musculoskeletal  o Denies  o : joint swelling, limitation of motion  · Endocrine  o Denies  o : cold intolerance, heat intolerance  · Psychiatric  o Denies  o : suicidal ideation, homicidal ideation  · Heme-Lymph  o Denies  o : petechiae, lymph node enlargement or tenderness  · Allergic-Immunologic  o Denies  o : frequent illnesses      Vitals  Date Time BP Position Site L\R Cuff Size HR RR TEMP (F) WT  HT  BMI kg/m2 BSA m2 O2 Sat HC       01/28/2020 11:51 /78 Sitting    94 - R  98.3 175lbs 5oz 5'  3\" 31.05 1.88 99 %          Physical Examination  · Constitutional  o Appearance  o : well developed, well-nourished, in no acute distress  · Head and Face  o HEENT  o : Unremarkable--slight redness to the OP and (+) white spot which appears like a tonsil stone-  · Eyes  o Conjunctivae  o : conjunctivae normal  · Neck  o Inspection/Palpation  o : supple  o Thyroid  o : no thyromegaly  · Respiratory  o Respiratory Effort  o : breathing unlabored  o Auscultation of Lungs  o : clear to ascultation  · Cardiovascular  o Heart  o :   § Auscultation of Heart  § : regular rate and rhythm  o Peripheral Vascular System  o :   § Extremities  § : no edema  · Gastrointestinal  o Abdominal Examination  o :   § Abdomen  § : soft nontender no suprapubic tenderness and no CVA tenderness   · Lymphatic  o Neck  o : no lymphadenopathy present  · Musculoskeletal  o General  o :   § General Musculoskeletal  § : No joint swelling or deformity., Muscle tone, strength, and development grossly normal.  · Skin and Subcutaneous Tissue  o General Inspection  o : skin turgor normal, texture normal  · Neurologic  o Gait and Station  o :   § Gait Screening  § : normal gait  · Psychiatric  o Mood and Affect  o : mood normal, affect appropriate          Results     reviewed the UA and " Cx with pt and then also the prior refills --that DR Mendez did--       Assessment  · Sore throat     462/J02.9  · Oral thrush     112.0/B37.0      Plan  · Orders  o IOP - Rapid Strep (53930) - 462/J02.9 - 01/28/2020   STREP- NEGATIVE  o ACO-39: Current medications updated and reviewed () - - 01/28/2020  · Medications  o nystatin 100,000 unit/mL oral suspension   SIG: take 5 milliliters (500,000 unit) by oral route 4 times per day for 7 days   DISP: (140) milliliters with 0 refills  Prescribed on 01/28/2020     o Medications have been Reconciled  o Transition of Care or Provider Policy  · Instructions  o Take all medications as prescribed/directed.  o Patient was educated/instructed on their diagnosis, treatment and medications prior to discharge from the clinic today.  o Patient instructed to seek medical attention urgently for new or worsening symptoms.  o Call the office with any concerns or questions.  o Chronic conditions reviewed and taken into consideration for today's treatment plan.  o stay well hydrated   · Disposition  o Call or Return if symptoms worsen or persist.     will treat for oral thrush based on the s/s and her just completing the antibiotics and the fact she's Diabetic     declined the repeat on the UA or a Cx again and declined the throat Cx as well and flu testing as well today             Electronically Signed by: MAYNOR Quintero -Author on February 3, 2020 12:17:33 PM
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
## 2021-05-07 NOTE — PROGRESS NOTES
Progress Note      Patient Name: Yamilex David   Patient ID: [id number]   Sex: Female   YOB: 1965    Primary Care Provider: Emory Mendez MD   Referring Provider: Emory Mendez MD    Visit Date: April 17, 2020    Provider: Emory Mendez MD   Location: [facility]   Location Address: [address] [place] [address] [place]  [address]   Location Phone: [phone]          Chief Complaint     headache, fatigue, shortness of breath, cough, headache, chest tightness, burning in throat (since March)       History Of Present Illness  Yamilex David is a 54 year old /White female who presents for evaluation and treatment of:      cold symptoms that began after returning from cruise on march 8, 2020- was seen at respiratory clinic on Monday- pt had negative covid 19 test reported today from clinic- pt has had chest pains intermittently lasts for 1-3 minutes- occurs frequently throughout day- becoming more frequent- pt gets SOA with episodes, pt has intermittent sore throat, she says that she feels she gets fevers intermittently    xrays:NAD  EKG: normal       Past Medical History  Disease Name Date Onset Notes   Anxiety --  --    Anxiety and depression 11/25/2019 --    Depression --  --    Essential hypertension 11/25/2019 --    GERD (gastroesophageal reflux disease) --  --    Hypertension, Benign Essential --  --    Seizures --  --          Past Surgical History  Procedure Name Date Notes   Hysterectomy --  --    Hysterectomy (due to cancer) --  --    Kidney removal --  --    Thyroid Removal --  --    Tonsillectomy --  --          Medication List  Name Date Started Instructions   Benadryl 25 mg oral capsule  --    buspirone 7.5 mg oral tablet 01/22/2020 take 1 tablet by mouth twice a day   Carafate 1 gram oral tablet 02/10/2020 take 1 tablet (1 gram) by oral route 4 times per day on an empty stomach 1 hour before meals and at bedtime for 30 days   Farxiga 10 mg oral tablet  02/03/2020 take 1 tablet (10 mg) by oral route once daily in the morning for 30 days   hydrochlorothiazide 12.5 mg oral tablet 02/03/2020 take 1 tablet (12.5 mg) by oral route once daily for 30 days   lisinopril oral  --    pantoprazole 40 mg oral tablet,delayed release (DR/EC) 01/22/2020 take 1 tablet by mouth once daily   Parlodel 2.5 mg oral tablet  take 1 tablet (2.5 mg) by oral route 3 times per day with food   Tegretol 100 mg/5 mL oral suspension  2 PILLS TID         Allergy List  Allergen Name Date Reaction Notes   NO KNOWN DRUG ALLERGIES --  --  --          Family Medical History  Disease Name Relative/Age Notes   DM Type I Father/  Mother/   Father; Mother   Hypertension Mother/   Mother   Arthrtis Mother/   Mother   Mother, Grandmother, or Sister developed Heart Disease before the age of 65  --          Social History  Finding Status Start/Stop Quantity Notes   Recreational Drug Use Never --/-- --  never used   Second hand smoke exposure Unknown --/-- --  no   Tobacco Never --/-- --  never smoker   Uses seatbelts --  --/-- --  yes         Review of Systems  · Constitutional  o Admits  o : fever, chills  o Denies  o : fatigue, body aches  · HENT  o Admits  o : sore throat  o Denies  o : nasal congestion, nasal discharge  · Cardiovascular  o Admits  o : chest pain  o Denies  o : syncope, palpitations  · Respiratory  o Admits  o : shortness of breath, cough  · Gastrointestinal  o Denies  o : nausea, vomiting, diarrhea, abdominal pain  · Integument  o Denies  o : rash      Vitals  Date Time BP Position Site L\R Cuff Size HR RR TEMP (F) WT  HT  BMI kg/m2 BSA m2 O2 Sat        04/17/2020 11:15 /80 Sitting    95 - R  98.4     100 %          Physical Examination  · Constitutional  o Appearance  o : well developed, well-nourished, in no acute distress  · Head and Face  o HEENT  o : MMM, erythema of throat, uvula midline, throat open, no abscesses seen, TM's bilaterally clear  · Respiratory  o Respiratory  Effort  o : breathing unlabored  o Auscultation of Lungs  o : clear to ascultation  · Cardiovascular  o Heart  o :   § Auscultation of Heart  § : regular rate and rhythm  o Peripheral Vascular System  o :   § Extremities  § : no edema  · Gastrointestinal  o Abdomen  o : soft, non-tender, non-distended, + bowel sounds, no hepatosplenomegaly, no masses palpated  · Musculoskeletal  o General  o :   § General Musculoskeletal  § : No joint swelling or deformity., Muscle tone, strength, and development grossly normal.  · Neurologic  o Gait and Station  o :   § Gait Screening  § : normal gait  · Psychiatric  o Mood and Affect  o : mood normal, affect appropriate          Assessment  · Chest pain     786.50/R07.9  · Cough     786.2/R05  · Shortness of breath     786.05/R06.02  · Pharyngitis     462/J02.9      Plan  · Orders  o CARDIOLOGY CONSULTATION (CARDI) - 786.50/R07.9 - 04/17/2020  o CBC with Auto Diff [facility] ([id number]) - 786.50/R07.9, 786.05/R06.02, 786.2/R05 - 04/17/2020  o CMP [facility] ([id number]) - 786.50/R07.9, 786.05/R06.02, 786.2/R05, 462/J02.9 - 04/17/2020  o Throat culture and sensitivity (39993) - 462/J02.9 - 04/17/2020  o ACO-39: Current medications updated and reviewed () - - 04/17/2020  o Xray chest 2 views [facility] ([id number]) - 786.50/R07.9, 786.05/R06.02, 786.2/R05, 462/J02.9 - 04/17/2020  o EKG (Recording and Interpretation) [facility] (Done and read at [facility]) (43772) - 786.50/R07.9, 786.05/R06.02, 786.2/R05, 462/J02.9 - 04/17/2020  o Troponin (51585) - 786.50/R07.9, 786.05/R06.02, 786.2/R05, 462/J02.9 - 04/17/2020  · Medications  o Ventolin HFA 90 mcg/actuation inhalation HFA aerosol inhaler   SIG: inhale 1 - 2 puffs (90 - 180 mcg) by inhalation route every 6 hours as needed for 30 days   DISP: (1) 8 gm canister with 1 refills  Prescribed on 04/17/2020     · Instructions  o Patient was educated/instructed on their diagnosis, treatment and medications prior to discharge from the clinic  today.            Electronically Signed by: Emory Mendez MD -Author on April 17, 2020 12:17:41 PM

## 2021-05-07 NOTE — PROGRESS NOTES
Progress Note      Patient Name: Yamilex David   Patient ID: [id number]   Sex: Female   YOB: 1965    Primary Care Provider: Emory Mendez MD   Referring Provider: Emory Mendez MD    Visit Date: February 20, 2020    Provider: Emory Mendez MD   Location: [facility]   Location Address: [address] [place] [address]   [place]  [address]   Location Phone: [phone]          Chief Complaint     follow up to [facility], PT needs to be extended per PT, and PT has advised her to get a TENS unit       History Of Present Illness  Yamilex David is a 54 year old /White female who presents for evaluation and treatment of:      Pt was in [facility] November 13, 2019- has been going to Physical therapy- had severe neck pain that radiated to right shoulder that is now getting better- Physical therapy will send forms for pt to get tens unit-  pt began having increased neck pain 2 weeks ago after doing exercises- no numbness or tingling- does have some burning down right arm- used to have headaches from neck that were tension headaches- headaches have gone away over last 3-4 weeks- pt needs extended physical therapy time- pain in neck worse in mornings upon awakening and by evening pain is resolving       Past Medical History  Disease Name Date Onset Notes   Anxiety --  --    Anxiety and depression 11/25/2019 --    Depression --  --    Essential hypertension 11/25/2019 --    GERD (gastroesophageal reflux disease) --  --    Hypertension, Benign Essential --  --    Seizures --  --          Past Surgical History  Procedure Name Date Notes   Hysterectomy --  --    Hysterectomy (due to cancer) --  --    Kidney removal --  --    Thyroid Removal --  --    Tonsillectomy --  --          Medication List  Name Date Started Instructions   Benadryl 25 mg oral capsule  --    buspirone 7.5 mg oral tablet 01/22/2020 take 1 tablet by mouth twice a day   Carafate 1 gram oral tablet 02/10/2020 take 1 tablet (1 gram)  "by oral route 4 times per day on an empty stomach 1 hour before meals and at bedtime for 30 days   Farxiga 10 mg oral tablet 02/03/2020 take 1 tablet (10 mg) by oral route once daily in the morning for 30 days   hydrochlorothiazide 12.5 mg oral tablet 02/03/2020 take 1 tablet (12.5 mg) by oral route once daily for 30 days   pantoprazole 40 mg oral tablet,delayed release (DR/EC) 01/22/2020 take 1 tablet by mouth once daily   Parlodel 2.5 mg oral tablet  take 1 tablet (2.5 mg) by oral route 3 times per day with food   Tegretol 100 mg/5 mL oral suspension  2 PILLS TID         Allergy List  Allergen Name Date Reaction Notes   NO KNOWN DRUG ALLERGIES --  --  --          Family Medical History  Disease Name Relative/Age Notes   DM Type I Father/  Mother/   Father; Mother   Hypertension Mother/   Mother   Arthrtis Mother/   Mother   Mother, Grandmother, or Sister developed Heart Disease before the age of 65  --          Social History  Finding Status Start/Stop Quantity Notes   Recreational Drug Use Never --/-- --  never used   Second hand smoke exposure Unknown --/-- --  no   Tobacco Never --/-- --  never smoker   Uses seatbelts --  --/-- --  yes         Review of Systems  · Constitutional  o Denies  o : fatigue, fever  · Cardiovascular  o Denies  o : chest pain, palpitations  · Respiratory  o Denies  o : shortness of breath, cough  · Musculoskeletal  o Admits  o : neck pain      Vitals  Date Time BP Position Site L\R Cuff Size HR RR TEMP (F) WT  HT  BMI kg/m2 BSA m2 O2 Sat        02/20/2020 12:54 /92 Sitting    103 - R  97.7 175lbs 4oz 5'  3\" 31.04 1.88 98 %          Physical Examination  · Constitutional  o Appearance  o : well developed, well-nourished, in no acute distress  · Neck  o Inspection/Palpation  o : supple  o Thyroid  o : no thyromegaly  · Respiratory  o Respiratory Effort  o : breathing unlabored  o Auscultation of Lungs  o : clear to ascultation  · Cardiovascular  o Heart  o :   § Auscultation of " Heart  § : regular rate and rhythm  o Peripheral Vascular System  o :   § Extremities  § : no edema  · Musculoskeletal  o General  o :   § General Musculoskeletal  § : bilateral neck paraspinal muscle tenderness, normal ROM, stable  · Neurologic  o Gait and Station  o :   § Gait Screening  § : normal gait  · Psychiatric  o Mood and Affect  o : mood normal, affect appropriate          Assessment  · Neck pain     723.1/M54.2    Problems Reconciled  Plan  · Orders  o ACO-39: Current medications updated and reviewed () - - 02/20/2020  o PHYSICAL THERAPY CONSULTATION ([facility]) - 723.1/M54.2 - 02/20/2020  · Medications  o telmisartan 20 mg oral tablet   SIG: take 1 tablet (20 mg) by oral route once daily for 30 days   DISP: (30) tablets with 1 refills  Discontinued on 02/20/2020     o Medications have been Reconciled  o Transition of Care or Provider Policy  · Instructions  o Patient was educated/instructed on their diagnosis, treatment and medications prior to discharge from the clinic today.  o Pt will get tens unit.            Electronically Signed by: Emory Mendez MD -Author on February 20, 2020 01:29:10 PM

## 2021-05-07 NOTE — PROGRESS NOTES
"   Progress Note      Patient Name: Yamilex David   Patient ID: [id number]   Sex: Female   YOB: 1965    Primary Care Provider: Emory Mendez MD   Referring Provider: Emory Mendez MD    Visit Date: February 3, 2020    Provider: Emory Mendez MD   Location: [facility]   Location Address: [address] [place] [address]   [place]  [address]   Location Phone: [phone]          Chief Complaint     discuss medicines, possible side effects with some. having some palpations and anxiety. Metformin makes her sick to stomach. just not feeling right, feels \"puffy and bloated\" all over especially in the mornings. Headaches  only took BP medicine this morning and feels alittle better.       History Of Present Illness  Yamilex David is a 54 year old /White female who presents for evaluation and treatment of:      HTN- uncontrolled not tolerating lisinopril due to fast heartbeat and swelling  DM II- pt not able to tolerate metformin, due to stomach issues- pt cannot tolerate fast acting or extended release med  pt took herself off prozac       Past Medical History  Disease Name Date Onset Notes   Anxiety --  --    Anxiety and depression 11/25/2019 --    Depression --  --    Essential hypertension 11/25/2019 --    GERD (gastroesophageal reflux disease) --  --    Hypertension, Benign Essential --  --    Seizures --  --          Past Surgical History  Procedure Name Date Notes   Hysterectomy --  --    Hysterectomy (due to cancer) --  --    Kidney removal --  --    Thyroid Removal --  --    Tonsillectomy --  --          Medication List  Name Date Started Instructions   Benadryl 25 mg oral capsule  --    buspirone 7.5 mg oral tablet 01/22/2020 take 1 tablet by mouth twice a day   Carafate 1 gram oral tablet 12/16/2019 take 1 tablet (1 gram) by oral route 4 times per day on an empty stomach 1 hour before meals and at bedtime for 30 days   fluoxetine 20 mg oral capsule 01/22/2020 take 1 " "capsule by mouth every evening   lisinopril 30 mg oral tablet 01/30/2020 take 1 tablet (30 mg) by oral route once daily for 90 days   metformin 500 mg oral tablet extended release 24 hr 12/30/2019 take 2 tablets (1,000 mg) by oral route 2 times per day for 30 days   pantoprazole 40 mg oral tablet,delayed release (DR/EC) 01/22/2020 take 1 tablet by mouth once daily   Parlodel 2.5 mg oral tablet  take 1 tablet (2.5 mg) by oral route 3 times per day with food   Protonix 40 mg oral tablet,delayed release (DR/EC) 11/25/2019 take 1 tablet (40 mg) by oral route once daily for 30 days   Prozac 20 mg oral capsule 11/25/2019 take 1 capsule (20 mg) by oral route once daily in the evening for 30 days   Tegretol 100 mg/5 mL oral suspension  2 PILLS TID         Allergy List  Allergen Name Date Reaction Notes   NO KNOWN DRUG ALLERGIES --  --  --          Family Medical History  Disease Name Relative/Age Notes   DM Type I Father/  Mother/   Father; Mother   Hypertension Mother/   Mother   Arthrtis Mother/   Mother   Mother, Grandmother, or Sister developed Heart Disease before the age of 65  --          Social History  Finding Status Start/Stop Quantity Notes   Recreational Drug Use Never --/-- --  never used   Second hand smoke exposure Unknown --/-- --  no   Tobacco Never --/-- --  never smoker   Uses seatbelts --  --/-- --  yes         Review of Systems  · Constitutional  o Denies  o : fatigue, fever  · Cardiovascular  o Denies  o : chest pain, palpitations  · Respiratory  o Denies  o : shortness of breath, cough  · Psychiatric  o Denies  o : anxiety, depression      Vitals  Date Time BP Position Site L\R Cuff Size HR RR TEMP (F) WT  HT  BMI kg/m2 BSA m2 O2 Sat        02/03/2020 02:10 /78 Sitting    106 - R  98 176lbs 2oz 5'  3\" 31.2 1.88 98 %          Physical Examination  · Constitutional  o Appearance  o : well developed, well-nourished, in no acute distress  · Respiratory  o Respiratory Effort  o : breathing " unlabored  o Auscultation of Lungs  o : clear to ascultation  · Cardiovascular  o Heart  o :   § Auscultation of Heart  § : regular rate and rhythm  o Peripheral Vascular System  o :   § Extremities  § : no edema  · Gastrointestinal  o Abdomen  o : soft, non-tender, non-distended, + bowel sounds, no hepatosplenomegaly, no masses palpated  · Musculoskeletal  o General  o :   § General Musculoskeletal  § : No joint swelling or deformity., Muscle tone, strength, and development grossly normal.  · Neurologic  o Gait and Station  o :   § Gait Screening  § : normal gait  · Psychiatric  o Mood and Affect  o : mood normal, affect appropriate          Assessment  · Diabetes mellitus, type 2     250.00/E11.9  · Essential hypertension     401.9/I10      Plan  · Orders  o ACO-27: Most recent 2019 HgbA1c 7-9 [facility] ([id number]) - 250.00/E11.9 - 02/03/2020  o ACO-39: Current medications updated and reviewed () - - 02/03/2020  · Medications  o telmisartan 20 mg oral tablet   SIG: take 1 tablet (20 mg) by oral route once daily for 30 days   DISP: (30) tablets with 1 refills  Prescribed on 02/03/2020     o hydrochlorothiazide 12.5 mg oral tablet   SIG: take 1 tablet (12.5 mg) by oral route once daily for 30 days   DISP: (30) tablets with 5 refills  Prescribed on 02/03/2020     o Farxiga 10 mg oral tablet   SIG: take 1 tablet (10 mg) by oral route once daily in the morning for 30 days   DISP: (30) tablets with 5 refills  Prescribed on 02/03/2020     o fluoxetine 20 mg oral capsule   SIG: take 1 capsule by mouth every evening   DISP: (30) Capsule with 5 refills  Discontinued on 02/03/2020     o lisinopril 30 mg oral tablet   SIG: take 1 tablet (30 mg) by oral route once daily for 90 days   DISP: (90) tablets with 1 refills  Discontinued on 02/03/2020     o metformin 500 mg oral tablet extended release 24 hr   SIG: take 2 tablets (1,000 mg) by oral route 2 times per day for 30 days   DISP: (120) tablets with 5 refills  Discontinued on  02/03/2020     o Protonix 40 mg oral tablet,delayed release (DR/EC)   SIG: take 1 tablet (40 mg) by oral route once daily for 30 days   DISP: (30) tablets with 1 refills  Discontinued on 02/03/2020     o Prozac 20 mg oral capsule   SIG: take 1 capsule (20 mg) by oral route once daily in the evening for 30 days   DISP: (30) capsules with 1 refills  Discontinued on 02/03/2020     o Medications have been Reconciled  o Transition of Care or Provider Policy  · Instructions  o Patient was educated/instructed on their diagnosis, treatment and medications prior to discharge from the clinic today.            Electronically Signed by: Emory Mendez MD -Author on February 3, 2020 02:38:04 PM

## 2021-05-07 NOTE — PROGRESS NOTES
Progress Note      Patient Name: Yamilex David   Patient ID: [id number]   Sex: Female   YOB: 1965        Visit Date: September 21, 2018    Provider: MAYNOR Novak   Location: [facility] [place]   Location Address: [address] [place] [address]  [id number]   Location Phone: [phone]          Chief Complaint     UTI symptoms off and on 3-4 weeks, steady for 1 week         History Of Present Illness  Yamilex David is a 53 year old /White female who presents for evaluation and treatment of:      UTI symptoms over the last 3-4 weeks and worse over the past week - bladder pressure and tenderness - irritated with mild burning - feels it slightly in the left flank - feverish at times - mild itching - recent increased frequency without urgency - denies extra thirst -   Pt states she is borderline diabetes, she is supposed to be on Metformin but cannot take due to already on too many medications for depression and dystonia   hx of kidney problems with double tubes from kidneys with reconstruction, she also had recurrent UTI's growing up -       Past Medical History  Disease Name Date Onset Notes   Anxiety --  --    Depression --  --    GERD (gastroesophageal reflux disease) --  --    Hypertension, Benign Essential --  --    Seizures --  --          Past Surgical History  Procedure Name Date Notes   Hysterectomy --  --    Hysterectomy (due to cancer) --  --    Kidney removal --  --    Thyroid Removal --  --    Tonsillectomy --  --          Medication List  Name Date Started Instructions   buspirone oral  --    lithium carbonate oral  --    Protonix oral  --    Tegretol oral  --          Allergy List  Allergen Name Date Reaction Notes   NO KNOWN DRUG ALLERGIES --  --  --          Family Medical History  Disease Name Relative/Age Notes   Arthrtis / Mother    Mother/    DM Type I / Father; Mother    Father/     Mother/    Hypertension / Mother    Mother/    Mother, Grandmother,  "or Sister developed Heart Disease before the age of 65 / --          Social History  Finding Status Start/Stop Quantity Notes   Recreational Drug Use Never --/-- --  never used   Second hand smoke exposure Unknown --/-- --  no   Tobacco Never --/-- --  never smoker   Uses seatbelts --  --/-- --  yes         Review of Systems  · Constitutional  o * See HPI  · Gastrointestinal  o * See HPI  · Genitourinary  o * See HPI  · Psychiatric  o * See HPI      Vitals  Date Time BP Position Site L\R Cuff Size HR RR TEMP(F) WT  HT  BMI kg/m2 BSA m2 O2 Sat HC       09/21/2018 11:33 /86 Sitting    89 - R  98.2 183lbs 6oz 5'  3\" 32.48 1.92 98 %           Physical Examination  · Constitutional  o Appearance  o : well developed, well-nourished, in no acute distress  · Eyes  o Conjunctivae  o : conjunctivae normal  o Pupils and Irises  o : pupils equal and round, pupils reactive to light bilaterally  · Neck  o Inspection/Palpation  o : supple  o Thyroid  o : no thyromegaly  · Respiratory  o Respiratory Effort  o : breathing unlabored  o Auscultation of Lungs  o : clear to ascultation  · Cardiovascular  o Heart  o :   § Auscultation of Heart  § : regular rate and rhythm  o Peripheral Vascular System  o :   § Extremities  § : no edema  · Lymphatic  o Neck  o : no lymphadenopathy present  · Musculoskeletal  o General  o :   § General Musculoskeletal  § : No joint swelling or deformity. Muscle tone, strength, and development grossly normal.  · Skin and Subcutaneous Tissue  o General Inspection  o : NL tone  · Neurologic  o Gait and Station  o :   § Gait Screening  § : normal gait  · Psychiatric  o Mood and Affect  o : mood normal, affect appropriate              Assessment  · Glucosuria     791.5/R81  · Pre-diabetes     790.29/R73.03  · Dysuria     788.1/R30.0      Plan  · Orders  o CBC with Auto Diff [facility] ([id number]) - 791.5/R81, 790.29/R73.03 - 09/21/2018  o CMP [facility] ([id number]) - 791.5/R81, 790.29/R73.03 - 09/21/2018  o Hgb A1c [facility] ([id number]) " - 791.5/R81, 790.29/R73.03 - 09/21/2018  o IOP - Urinalysis without Microscopy (Clinitek) [facility] ([id number]) - 788.1/R30.0 - 09/21/2018   GLU 500mg/dL, CATERINA neg, KET neg, SG 1.015, BLO neg, pH 5.5, PRO neg, URO 0.2 E.U./dL, NIT neg, MICHEL neg  o ACO-39: Current medications updated and reviewed () - - 09/21/2018  · Medications  o fluconazole 150 mg oral tablet   SIG: take 1 tablet (150 mg) by oral route once for 2 days   DISP: (2) tablets with 0 refills  Prescribed on 09/21/2018     · Instructions  o Take all medications as prescribed/directed.  o Patient was educated/instructed on their diagnosis, treatment and medications prior to discharge from the clinic today.  o Treat as a yeast infection and labs ordered to further evaluate glucose in urine.   · Disposition  o Follow up as needed.            Electronically Signed by: MAYNOR Novak -Author on September 21, 2018 01:35:52 PM

## 2021-05-09 VITALS — TEMPERATURE: 98.4 F | OXYGEN SATURATION: 99 % | HEART RATE: 81 BPM

## 2021-05-09 VITALS
TEMPERATURE: 98.4 F | OXYGEN SATURATION: 100 % | DIASTOLIC BLOOD PRESSURE: 80 MMHG | HEART RATE: 95 BPM | SYSTOLIC BLOOD PRESSURE: 112 MMHG

## 2021-05-09 VITALS
DIASTOLIC BLOOD PRESSURE: 104 MMHG | OXYGEN SATURATION: 97 % | DIASTOLIC BLOOD PRESSURE: 102 MMHG | BODY MASS INDEX: 31.74 KG/M2 | HEIGHT: 63 IN | TEMPERATURE: 96.8 F | WEIGHT: 179.12 LBS | SYSTOLIC BLOOD PRESSURE: 160 MMHG | HEART RATE: 98 BPM | SYSTOLIC BLOOD PRESSURE: 166 MMHG

## 2021-05-09 VITALS
BODY MASS INDEX: 31.8 KG/M2 | WEIGHT: 179.5 LBS | TEMPERATURE: 97.6 F | SYSTOLIC BLOOD PRESSURE: 122 MMHG | HEIGHT: 63 IN | OXYGEN SATURATION: 99 % | DIASTOLIC BLOOD PRESSURE: 76 MMHG | HEART RATE: 102 BPM

## 2021-05-09 VITALS
SYSTOLIC BLOOD PRESSURE: 148 MMHG | SYSTOLIC BLOOD PRESSURE: 140 MMHG | OXYGEN SATURATION: 98 % | DIASTOLIC BLOOD PRESSURE: 90 MMHG | WEIGHT: 175 LBS | TEMPERATURE: 98.1 F | HEART RATE: 100 BPM | DIASTOLIC BLOOD PRESSURE: 80 MMHG

## 2021-05-09 VITALS
OXYGEN SATURATION: 99 % | SYSTOLIC BLOOD PRESSURE: 154 MMHG | DIASTOLIC BLOOD PRESSURE: 78 MMHG | HEART RATE: 94 BPM | WEIGHT: 175.31 LBS | HEIGHT: 63 IN | BODY MASS INDEX: 31.06 KG/M2 | TEMPERATURE: 98.3 F

## 2021-05-09 VITALS
WEIGHT: 175 LBS | HEART RATE: 94 BPM | TEMPERATURE: 97.7 F | OXYGEN SATURATION: 96 % | DIASTOLIC BLOOD PRESSURE: 84 MMHG | SYSTOLIC BLOOD PRESSURE: 158 MMHG

## 2021-05-09 VITALS
BODY MASS INDEX: 32.49 KG/M2 | WEIGHT: 183.37 LBS | TEMPERATURE: 98.2 F | DIASTOLIC BLOOD PRESSURE: 86 MMHG | OXYGEN SATURATION: 98 % | SYSTOLIC BLOOD PRESSURE: 138 MMHG | HEIGHT: 63 IN | HEART RATE: 89 BPM

## 2021-05-09 VITALS
SYSTOLIC BLOOD PRESSURE: 138 MMHG | TEMPERATURE: 98.9 F | HEART RATE: 86 BPM | BODY MASS INDEX: 32.39 KG/M2 | HEIGHT: 62 IN | DIASTOLIC BLOOD PRESSURE: 90 MMHG | OXYGEN SATURATION: 98 % | WEIGHT: 176 LBS

## 2021-05-09 VITALS
OXYGEN SATURATION: 97 % | HEART RATE: 87 BPM | WEIGHT: 177.25 LBS | SYSTOLIC BLOOD PRESSURE: 150 MMHG | TEMPERATURE: 97.4 F | DIASTOLIC BLOOD PRESSURE: 90 MMHG

## 2021-05-09 VITALS
HEIGHT: 63 IN | OXYGEN SATURATION: 98 % | HEART RATE: 106 BPM | DIASTOLIC BLOOD PRESSURE: 78 MMHG | BODY MASS INDEX: 31.21 KG/M2 | SYSTOLIC BLOOD PRESSURE: 132 MMHG | TEMPERATURE: 98 F | WEIGHT: 176.12 LBS

## 2021-05-09 VITALS — SYSTOLIC BLOOD PRESSURE: 166 MMHG | DIASTOLIC BLOOD PRESSURE: 102 MMHG

## 2021-05-09 VITALS
HEART RATE: 103 BPM | HEIGHT: 63 IN | SYSTOLIC BLOOD PRESSURE: 138 MMHG | BODY MASS INDEX: 31.05 KG/M2 | WEIGHT: 175.25 LBS | DIASTOLIC BLOOD PRESSURE: 92 MMHG | OXYGEN SATURATION: 98 % | TEMPERATURE: 97.7 F

## 2021-05-09 VITALS — OXYGEN SATURATION: 99 % | HEART RATE: 74 BPM | TEMPERATURE: 97.4 F

## 2021-05-09 VITALS — SYSTOLIC BLOOD PRESSURE: 144 MMHG | DIASTOLIC BLOOD PRESSURE: 102 MMHG

## 2021-05-10 NOTE — H&P
History and Physical      Patient Name: Yamilex David   Patient ID: [id number]   Sex: Female   YOB: 1965    Referring Provider: Emory Mendez MD    Visit Date: April 29, 2020    Provider: Gordon Casey MD   Location: [facility]   Location Address: [address] [place] [address], [facility]   [place]  [id number]   Location Phone: [phone]          Chief Complaint  · Chest pain  · Shortness of breath       History Of Present Illness  Consult requested by: Emory Mendez MD   Yamilex David is a 54-year-old female who had been on a cruise and was in close contact with a few of her cruise-mates who turned out to be positive for Covid-19. She never got tested, but has been having chest pain and shortness of breath since her trip a month or two ago. She has history of hypertension and diabetes, but both have been poorly controlled over time. She describes her pain as a sensation of heaviness that comes on when she exerts with palpitations and shortness of breath. Her shortness of breath has gotten worse over the last few weeks. She denies dizziness or syncope.   PAST MEDICAL HISTORY: Positive for diabetes, hypertension. Negative for myocardial infarction.   FAMILY HISTORY: Positive for premature atherosclerosis.   CURRENT MEDICATIONS: include inhalers; Tegretol 100 mg three times a day; Benadryl 25 mg b.i.d. p.r.n.; Farxiga 10 mg daily; HCTZ 12.5 mg daily; Protonix 40 mg daily; Lisinopril 10 mg daily. The dosage and frequency of the medications were reviewed with the patient.   CHOLESTEROL STATUS: Uncertain.   PSYCHOSOCIAL HISTORY: She had major depression in 2011, but has been cured. She is . She rarely consumes alcohol. Denies smoking, ethanol abuse, or recreational drug abuse.   ALLERGIES: No known drug allergies.       Review of Systems  · Constitutional  o Admits  o : fatigue  o Denies  o : good general health lately, recent weight changes   · Eyes  o Denies  o : double  "vision  · HENT  o Admits  o : hearing loss or ringing  o Denies  o : chronic sinus problem, swollen glands in neck  · Cardiovascular  o Admits  o : chest pain, palpitations (fast, fluttering, or skipping beats), swelling (feet, ankles, hands), shortness of breath while walking or lying flat  · Respiratory  o Denies  o : shortness of breath, asthma or wheezing, COPD  · Gastrointestinal  o Denies  o : ulcers, nausea or vomiting  · Neurologic  o Admits  o : lightheaded or dizzy  o Denies  o : stroke, headaches  · Musculoskeletal  o Admits  o : joint pain, back pain  · Endocrine  o Admits  o : diabetes, heat or cold intolerance  o Denies  o : thyroid disease, excessive thirst or urination  · Heme-Lymph  o Denies  o : bleeding or bruising tendency, anemia      Vitals  Date Time BP Position Site L\R Cuff Size HR RR TEMP (F) WT  HT  BMI kg/m2 BSA m2 O2 Sat HC       04/29/2020 02:59 /106 Sitting    74 - R   174lbs 16oz 5'  3\" 31 1.88     04/29/2020 02:59 /106 Sitting                     Physical Examination  · Constitutional  o Appearance  o : Awake, alert, in no acute distress.  · Eyes  o Conjunctivae  o : Conjunctivae normal.  o Pupils and Irises  o : Fundi normal.  · Ears, Nose, Mouth and Throat  o Oral Cavity  o :   § Oral Mucosa  § : Normal oral mucosa.  · Neck  o Inspection/Palpation/Auscultation  o : No lymphadenopathy. No JVD. No bruit. Good carotid upstroke.  · Respiratory  o Respiratory  o : Clear to percussion and auscultation. Good respiratory effort.  · Cardiovascular  o Heart  o : PMI is not well felt. S1, S2 normal. No S3. No S4. Faint systolic murmur at the apex.   o Peripheral Vascular System  o :   § Femoral Arteries  § : Good femoral pulses.  § Pedal Pulses  § : Good pedal pulses. No pedal edema.  · Gastrointestinal  o Abdominal Examination  o : Soft. No masses or tenderness noted. No hepatosplenomegaly. Abdominal aorta not palpable.  · Musculoskeletal  o General  o : Muscle strength is normal " with normal tone.  · Skin and Subcutaneous Tissue  o General Inspection  o : Within normal limits.  o Digits and Nails  o : Nails are normal.  · EKG  o EKG  o : Done on April 17, 2020.   o Results  o : Sinus rhythm, normal axis. No significant abnormalities noted.   · Labs  o Labs  o : Chemistry panel from November 2019 was normal. Lipid panel then showed HDL 54, , triglyceride 77. Chemistry panel from 04/17/2020 was normal when she was seen in the emergency room, with A1c in December 2019 of 8.5% with normal thyroid panel in November 2019.           Assessment     1.  Chest pain, somewhat atypical.   2.  Shortness of breath.   3.  Hypertensive heart disease.   4.  Diabetes mellitus.       Plan     1.  Will proceed with an echocardiogram today and a stress test in the next week.   2.  I am going to initiate therapy with Toprol XL 25 mg 1/2 tablet at bedtime with blood pressure and heart        rate monitoring.    3.  She will continue the rest of her medications for now.   4.  Aspirin 81 mg a day.   5.  Further management decisions contingent upon the above evaluation and response to treatment.      Gordon Casey MD, [facility]  PM/pap    This note was transcribed by Sarah Espinosa.  pap/pm  The above service was transcribed by Sarah Espionsa, and I attest to the accuracy of the note.  PM             Electronically Signed by: Gordon Casey MD -Author on May 5, 2020 03:20:24 PM

## 2021-05-10 NOTE — PROCEDURES
"   Procedure Note      Patient Name: Yamilex David   Patient ID: [id number]   Sex: Female   YOB: 1965    Referring Provider: Emory Mendez MD    Visit Date: April 29, 2020    Provider: Gordon Casey MD   Location: [facility]   Location Address: [address] [place] [address], [facility]   [place]  [id number]   Location Phone: [phone]          FINAL REPORT   TRANSTHORACIC ECHOCARDIOGRAM REPORT    Diagnosis: Chest pain, precordial   Height: 5'3\" Weight: 175 B/P: 142/106 BSA: 1.8   Tech: JTW   MEASUREMENTS:  RVID (Diastole) : RVID. (NORMAL: 0.7 to 2.4 cm max)   LVID (Systole): 2.8 cm (Diastole): 4.0 cm . (NORMAL: 3.7 - 5.4 cm)   Posterior Wall Thickness (Diastole): 0.9 cm. (NORMAL: 0.8 - 1.1 cm)   Septal Thickness (Diastole): 0.9 cm. (NORMAL: 0.7 - 1.2 cm)   LAID (Systole): 2.9 cm. (NORMAL: 1.9 - 3.8 cm)   Aortic Root Diameter (Diastole): 3.1 cm. (NORMAL: 2.0 - 3.7 cm)   COMMENTS:  COMMENT: The patient underwent 2-D, M-Mode, and Doppler examination, including pulse-wave, continuous-wave, and color-flow Doppler analysis; the study is technically adequate. The following was observed:   FINDINGS:  MITRAL VALVE: Normal. E to F slope was normal. No evidence of any prolapse.   AORTIC VALVE: Normal with three cusps. Normal central closure. No evidence of any obstruction.   TRICUSPID VALVE: Normal.   PULMONIC VALVE: Normal.   LEFT ATRIUM: Normal. No masses seen. LA volume is 16 mL/m2.   AORTIC ROOT: Normal in size and motion.   LEFT VENTRICLE: The left ventricular chamber size is normal. The left ventricular wall thickness is normal. The left ventricular systolic function is normal with an estimated EF of 60%. No significant regional wall motion abnormalities are identified.   RIGHT ATRIUM: Normal.   RIGHT VENTRICLE: Normal size and function.   PERICARDIUM: No effusion.   INFERIOR VENA CAVA: Diameter is 1.3 cm with greater than 50% reduction with inspiration.   DOPPLER: Pulse-wave, continuous " wave, and color-flow Doppler evaluation was performed. E/A ratio is 0.8. DT= 162 msec. IVRT is 102 msec. E/E' is 11. There is trace mitral valve regurgitation present.   Faxed: 04/30/2020      CONCLUSION:    1.  Normal left ventricular chamber size with normal left ventricular systolic function with an estimated ejection        fraction of 60%.  No significant regional wall motion abnormalities identified.    2.  Trace mitral valve regurgitation of no hemodynamic consequence.  3.  Left ventricular diastolic dysfunction.        Gordon Casey MD, [facility]  PM/                 Electronically Signed by: Rocío Ferris-, Other -Author on April 30, 2020 10:45:50 AM  Electronically Co-signed by: Gordon Casey MD -Reviewer on May 5, 2020 03:36:30 PM

## 2021-05-10 NOTE — H&P
History and Physical      Patient Name: Yamilex David   Patient ID: [id number]   Sex: Female   YOB: 1965    Referring Provider: Emory Mendez MD    Visit Date: April 13, 2020    Provider: Diamond Abdalla PA-C   Location: [facility]   Location Address: [address] [place] [address]  [id number]   Location Phone: [phone]          Chief Complaint  · Evaluation for Respiratory Virus      History Of Present Illness  Yamilex David is a 54 year old /White female who presents today to the clinic for evaluation of a respiratory virus.   Date of Onset: 03/30/2020   What Symptoms: chills, cough, headache, and shortness of breath   Quality of Symptoms: 3 week history of cough - productive with white phlegm, shortness of breath with activity, headache and chills, Tylenol is not helping. She is also with chest tightness.   Anything make it worse or better: nothing has helped so far   Severity of Symptoms: moderately bothersome   Any known Exposure to COVID-19: was on a cruise line that emailed her and said other people on cruise had tested positive. She is a retired nurse.       Past Medical History  Diabetes; Hypertension         Medication List  Farxiga 5 mg oral tablet; lisinopril 10 mg oral tablet; Protonix 20 mg oral tablet,delayed release (DR/EC); Tegretol 200 mg oral tablet         Allergy List  NO KNOWN DRUG ALLERGIES       Allergies Reconciled  Review of Systems  · Constitutional  o Admits  o : chills  o Denies  o : fatigue, fever, weight gain, weight loss  · Respiratory  o Admits  o : shortness of breath, cough  o Denies  o : asthma  · Gastrointestinal  o Denies  o : nausea, vomiting, diarrhea, constipation, abdominal pain  · Integument  o Denies  o : rash  · Neurologic  o Admits  o : headache      Vitals  Date Time BP Position Site L\R Cuff Size HR RR TEMP (F) WT  HT  BMI kg/m2 BSA m2 O2 Sat HC       04/13/2020 11:18 AM      79 - R  98.6     98 %           Physical Examination  · Constitutional  o Appearance  o : no acute distress, well-nourished  · Head and Face  o Head  o :   § Inspection  § : atraumatic, normocephalic  · Eyes  o Eyes  o : extraocular movements intact, no scleral icterus, no conjunctival injection  · Respiratory  o Respiratory Effort  o : breathing comfortably, symmetric chest rise  o Auscultation of Lungs  o : clear to asculatation bilaterally, no wheezes, rales, or rhonchii  · Cardiovascular  o Heart  o :   § Auscultation of Heart  § : regular rate and rhythm, no murmurs, rubs, or gallops  o Peripheral Vascular System  o :   § Extremities  § : no edema  · Lymphatic  o Neck  o : no lymphadenopathy present  o Supraclavicular Nodes  o : no supraclavicular nodes  · Skin and Subcutaneous Tissue  o General Inspection  o : normal inspection  · Neurologic  o Mental Status Examination  o :   § Orientation  § : grossly oriented to person, place and time  o Gait and Station  o :   § Gait Screening  § : normal gait  · Psychiatric  o General  o : normal mood and affect              Assessment  · Cough     786.2/R05  · Viral illness     079.99/B34.9      Plan  · Orders  o [facility] NCOV2 (send-out) (50080) - 079.99/B34.9 - 04/13/2020  · Medications  o Medications have been Reconciled  o Transition of Care or Provider Policy  · Instructions  o Plan of Care:   o Patient instructed to seek medical attention urgently for new or worsening symptoms.  o Patient was educated on their diagnosis, treatment, and medications today.   o Recommend self monitoring. Instructions given.   o Stay home until without fever for 72 hours without using fever-reducing medications and other symptoms are gone.  o Recommends over the counter medications for symptom management.  o Patient was swabbed for covid - 19. She will be notified of results. No test covid handout paperwork given.   · Disposition  o Call or Return if symptoms worsen or  persist.  · Correspondence  o CC this document (Emory Mendez MD) - 04/14/2020            Electronically Signed by: Diamond Abdalla PA-C -Author on April 14, 2020 09:42:01 AM

## 2021-05-15 VITALS
HEART RATE: 74 BPM | DIASTOLIC BLOOD PRESSURE: 106 MMHG | HEIGHT: 63 IN | WEIGHT: 175 LBS | BODY MASS INDEX: 31.01 KG/M2 | SYSTOLIC BLOOD PRESSURE: 142 MMHG

## 2021-05-15 VITALS — TEMPERATURE: 98.6 F | HEART RATE: 79 BPM | OXYGEN SATURATION: 98 %

## 2021-08-19 ENCOUNTER — OFFICE VISIT (OUTPATIENT)
Dept: FAMILY MEDICINE CLINIC | Facility: CLINIC | Age: 56
End: 2021-08-19

## 2021-08-19 VITALS
TEMPERATURE: 97.3 F | WEIGHT: 175.4 LBS | SYSTOLIC BLOOD PRESSURE: 150 MMHG | OXYGEN SATURATION: 96 % | DIASTOLIC BLOOD PRESSURE: 90 MMHG | HEIGHT: 62 IN | BODY MASS INDEX: 32.28 KG/M2 | HEART RATE: 85 BPM

## 2021-08-19 DIAGNOSIS — F32.A ANXIETY AND DEPRESSION: Primary | ICD-10-CM

## 2021-08-19 DIAGNOSIS — K21.9 GASTROESOPHAGEAL REFLUX DISEASE WITHOUT ESOPHAGITIS: ICD-10-CM

## 2021-08-19 DIAGNOSIS — F41.9 ANXIETY AND DEPRESSION: Primary | ICD-10-CM

## 2021-08-19 PROCEDURE — 99213 OFFICE O/P EST LOW 20 MIN: CPT | Performed by: FAMILY MEDICINE

## 2021-08-19 RX ORDER — BUSPIRONE HYDROCHLORIDE 7.5 MG/1
7.5 TABLET ORAL 2 TIMES DAILY
Qty: 180 TABLET | Refills: 1 | Status: SHIPPED | OUTPATIENT
Start: 2021-08-19

## 2021-08-19 RX ORDER — FLUOXETINE HYDROCHLORIDE 20 MG/1
20 CAPSULE ORAL DAILY
Qty: 90 CAPSULE | Refills: 1 | Status: SHIPPED | OUTPATIENT
Start: 2021-08-19

## 2021-08-19 RX ORDER — BUSPIRONE HYDROCHLORIDE 7.5 MG/1
7.5 TABLET ORAL DAILY
COMMUNITY
End: 2021-08-19 | Stop reason: SDUPTHER

## 2021-08-19 RX ORDER — DIPHENHYDRAMINE HCL 25 MG
CAPSULE ORAL
COMMUNITY

## 2021-08-19 RX ORDER — DAPAGLIFLOZIN 5 MG/1
TABLET, FILM COATED ORAL
COMMUNITY

## 2021-08-19 RX ORDER — PANTOPRAZOLE SODIUM 40 MG/1
40 TABLET, DELAYED RELEASE ORAL DAILY
COMMUNITY
End: 2021-08-19 | Stop reason: SDUPTHER

## 2021-08-19 RX ORDER — PANTOPRAZOLE SODIUM 40 MG/1
40 TABLET, DELAYED RELEASE ORAL DAILY
Qty: 90 TABLET | Refills: 1 | Status: SHIPPED | OUTPATIENT
Start: 2021-08-19

## 2021-08-19 NOTE — PROGRESS NOTES
Chief Complaint  No chief complaint on file.  Need refill on meds- anxiety, depression    Subjective          Yamilex David presents to [facility]  Anxiety  Presents for follow-up visit. Symptoms include depressed mood, excessive worry, insomnia and nervous/anxious behavior. Patient reports no chest pain, compulsions, confusion, decreased concentration, dizziness, dry mouth, feeling of choking, hyperventilation, irritability, malaise, muscle tension, nausea, obsessions, palpitations, panic, restlessness, shortness of breath or suicidal ideas. Symptoms occur constantly. The severity of symptoms is moderate. The quality of sleep is fair. Nighttime awakenings: several.     Her past medical history is significant for depression. Compliance with medications is %. Treatment side effects: no side effects.   Heartburn  She complains of heartburn. She reports no abdominal pain, no belching, no chest pain, no choking, no coughing, no dysphagia, no early satiety, no hoarse voice, no nausea, no sore throat, no stridor or no wheezing. This is a chronic problem. The current episode started more than 1 year ago. The problem occurs rarely. The problem has been gradually improving. The heartburn is located in the substernum. The heartburn does not wake her from sleep. The heartburn does not limit her activity. The heartburn doesn't change with position. Nothing aggravates the symptoms. Pertinent negatives include no weight loss. She has tried a PPI for the symptoms. The treatment provided significant relief.   Depression  Visit Type: follow-up  Patient presents with the following symptoms: depressed mood, excessive worry, insomnia and nervousness/anxiety.  Patient is not experiencing: anhedonia, chest pain, choking sensation, compulsions, confusion, decreased concentration, dizziness, dry mouth, fatigue, feelings of hopelessness, feelings of worthlessness, hypersomnia, hyperventilation,  "irritability, malaise, memory impairment, muscle tension, nausea, obsessions, palpitations, panic, restlessness, shortness of breath, suicidal ideas, suicidal planning, thoughts of death, weight gain and weight loss.  Frequency of symptoms: constantly   Severity: moderate   Sleep quality: fair  Nighttime awakenings: several  Compliance with medications:  %  Treatment side effects: no side effects.      Objective   No Known Allergies  Immunization History   Administered Date(s) Administered   • Influenza, Unspecified 10/24/2019       Vital Signs:   Vitals:    08/19/21 1602   BP: 150/90   Pulse: 85   Temp: 97.3 °F (36.3 °C)   SpO2: 96%   Weight: 79.6 kg (175 lb 6.4 oz)   Height: 157.5 cm (62\")       Physical Exam  Vitals reviewed.   Constitutional:       Appearance: Normal appearance. She is well-developed.   HENT:      Head: Normocephalic and atraumatic.      Right Ear: External ear normal.      Left Ear: External ear normal.      Mouth/Throat:      Pharynx: No oropharyngeal exudate.   Eyes:      Conjunctiva/sclera: Conjunctivae normal.      Pupils: Pupils are equal, round, and reactive to light.   Cardiovascular:      Rate and Rhythm: Normal rate and regular rhythm.      Pulses: Normal pulses.      Heart sounds: Normal heart sounds. No murmur heard.   No friction rub. No gallop.    Pulmonary:      Effort: Pulmonary effort is normal.      Breath sounds: Normal breath sounds. No wheezing or rhonchi.   Abdominal:      General: Bowel sounds are normal. There is no distension.      Palpations: Abdomen is soft.      Tenderness: There is no abdominal tenderness.   Skin:     General: Skin is warm and dry.   Neurological:      Mental Status: She is alert and oriented to person, place, and time.      Cranial Nerves: No cranial nerve deficit.   Psychiatric:         Mood and Affect: Mood and affect normal.         Behavior: Behavior normal.         Thought Content: Thought content normal.         Judgment: Judgment " normal.        Result Review :   The following data was reviewed by: Emory Mendez MD on 08/19/2021:                            Assessment and Plan    Diagnoses and all orders for this visit:    1. Anxiety and depression (Primary)  -     busPIRone (BUSPAR) 7.5 MG tablet; Take 1 tablet by mouth 2 (Two) Times a Day.  Dispense: 180 tablet; Refill: 1  -     FLUoxetine (PROzac) 20 MG capsule; Take 1 capsule by mouth Daily.  Dispense: 90 capsule; Refill: 1    2. Gastroesophageal reflux disease without esophagitis  -     pantoprazole (PROTONIX) 40 MG EC tablet; Take 1 tablet by mouth Daily.  Dispense: 90 tablet; Refill: 1            Follow Up {Instructions Charge Capture  Follow-up Communications :23}  Return in about 1 month (around 9/19/2021) for Recheck.  Patient was given instructions and counseling regarding her condition or for health maintenance advice. Please see specific information pulled into the AVS if appropriate.

## 2021-11-26 RX ORDER — DAPAGLIFLOZIN 10 MG/1
TABLET, FILM COATED ORAL
Qty: 30 TABLET | Refills: 0 | Status: CANCELLED | OUTPATIENT
Start: 2021-11-26

## 2022-02-07 ENCOUNTER — TELEPHONE (OUTPATIENT)
Dept: FAMILY MEDICINE CLINIC | Facility: CLINIC | Age: 57
End: 2022-02-07

## 2022-02-07 NOTE — TELEPHONE ENCOUNTER
REFILL    FARXIGA  10 MG  1 DAILY        PLEASE SEND TO  [facility]  [phone]  [phone]

## 2024-12-28 ENCOUNTER — READMISSION MANAGEMENT (OUTPATIENT)
Dept: CALL CENTER | Facility: HOSPITAL | Age: 59
End: 2024-12-28
Payer: MEDICARE

## 2024-12-28 NOTE — OUTREACH NOTE
Prep Survey      Flowsheet Row Responses   [facility] facility patient discharged from? Non-BH   Is LACE score < 7 ? Non-BH Discharge   Eligibility [facility]   Date of Admission 12/25/24   Date of Discharge 12/28/24   Discharge Disposition Home or Self Care   Discharge diagnosis Dysphagia   Does the patient have one of the following disease processes/diagnoses(primary or secondary)? Other   Does the patient have Home health ordered? Yes   Prep survey completed? Yes            SAVANNAH PRICE - Registered Nurse

## 2024-12-30 ENCOUNTER — TRANSITIONAL CARE MANAGEMENT TELEPHONE ENCOUNTER (OUTPATIENT)
Dept: CALL CENTER | Facility: HOSPITAL | Age: 59
End: 2024-12-30
Payer: MEDICARE

## 2024-12-30 NOTE — OUTREACH NOTE
Call Center TCM Note      Flowsheet Row Responses   [facility] patient discharged from? Non-  [[facility]]   Does the patient have one of the following disease processes/diagnoses(primary or secondary)? Other   TCM attempt successful? No   Unsuccessful attempts Attempt 1   Revoked Reason Other  [Appears pt not seen by  PCP since 2021 as no longer in area,  resides in [place] to [place]]            Margi Lowe RN    12/30/2024, 14:43 EST